# Patient Record
Sex: MALE | Race: BLACK OR AFRICAN AMERICAN | Employment: FULL TIME | URBAN - METROPOLITAN AREA
[De-identification: names, ages, dates, MRNs, and addresses within clinical notes are randomized per-mention and may not be internally consistent; named-entity substitution may affect disease eponyms.]

---

## 2019-05-27 ENCOUNTER — HOSPITAL ENCOUNTER (EMERGENCY)
Age: 44
Discharge: HOME | End: 2019-05-27
Payer: COMMERCIAL

## 2019-05-27 ENCOUNTER — APPOINTMENT (OUTPATIENT)
Dept: GENERAL RADIOLOGY | Age: 44
End: 2019-05-27
Payer: COMMERCIAL

## 2019-05-27 VITALS
SYSTOLIC BLOOD PRESSURE: 144 MMHG | RESPIRATION RATE: 16 BRPM | HEIGHT: 71 IN | BODY MASS INDEX: 27.72 KG/M2 | TEMPERATURE: 98.3 F | HEART RATE: 76 BPM | WEIGHT: 198 LBS | DIASTOLIC BLOOD PRESSURE: 82 MMHG | OXYGEN SATURATION: 98 %

## 2019-05-27 DIAGNOSIS — M25.561 ACUTE PAIN OF RIGHT KNEE: Primary | ICD-10-CM

## 2019-05-27 DIAGNOSIS — R03.0 ELEVATED BLOOD PRESSURE READING: ICD-10-CM

## 2019-05-27 PROCEDURE — 99283 EMERGENCY DEPT VISIT LOW MDM: CPT

## 2019-05-27 PROCEDURE — 73564 X-RAY EXAM KNEE 4 OR MORE: CPT

## 2019-05-27 PROCEDURE — 6370000000 HC RX 637 (ALT 250 FOR IP): Performed by: NURSE PRACTITIONER

## 2019-05-27 RX ORDER — NAPROXEN 500 MG/1
500 TABLET ORAL 2 TIMES DAILY WITH MEALS
Qty: 20 TABLET | Refills: 0 | Status: SHIPPED | OUTPATIENT
Start: 2019-05-27 | End: 2019-06-06

## 2019-05-27 RX ORDER — IBUPROFEN 400 MG/1
400 TABLET ORAL ONCE
Status: COMPLETED | OUTPATIENT
Start: 2019-05-27 | End: 2019-05-27

## 2019-05-27 RX ORDER — OXYCODONE HYDROCHLORIDE AND ACETAMINOPHEN 5; 325 MG/1; MG/1
1 TABLET ORAL ONCE
Status: COMPLETED | OUTPATIENT
Start: 2019-05-27 | End: 2019-05-27

## 2019-05-27 RX ADMIN — OXYCODONE HYDROCHLORIDE AND ACETAMINOPHEN 1 TABLET: 5; 325 TABLET ORAL at 19:22

## 2019-05-27 RX ADMIN — IBUPROFEN 400 MG: 400 TABLET, FILM COATED ORAL at 19:23

## 2019-05-27 ASSESSMENT — PAIN SCALES - GENERAL
PAINLEVEL_OUTOF10: 3

## 2019-05-27 ASSESSMENT — PAIN DESCRIPTION - ORIENTATION: ORIENTATION: LEFT

## 2019-05-27 ASSESSMENT — PAIN DESCRIPTION - LOCATION: LOCATION: KNEE

## 2019-05-27 ASSESSMENT — PAIN DESCRIPTION - DESCRIPTORS: DESCRIPTORS: TIGHTNESS

## 2019-05-27 NOTE — ED NOTES
Knee immobilizer applied to left knee, CMP's intact before and after application      Waleska Parks, MATTHEW  05/27/19 9029

## 2019-05-27 NOTE — ED PROVIDER NOTES
[facility]    HPI:  5/27/19,   Time: 7:16 PM         Chuy Maravilla is a 38 y.o. male presenting to the ED from home with family and complains of acute onset of left knee pain and swelling that began pta when he felt a pop when jumping up during a kickball game. The complaint has been persistent, mild in severity, and worsened by walking. ROS:   Pertinent positives and negatives are stated within HPI, all other systems reviewed and are negative.  --------------------------------------------- PAST HISTORY ---------------------------------------------  Past Medical History:  has no past medical history on file. Past Surgical History:  has a past surgical history that includes Foot surgery. Social History:  reports that he has never smoked. He has never used smokeless tobacco. He reports that he drinks alcohol. He reports that he has current or past drug history. Drug: Marijuana. Family History: family history is not on file. The patients home medications have been reviewed. Allergies: Patient has no known allergies. -------------------------------------------------- RESULTS -------------------------------------------------  All laboratory and radiology results have been personally reviewed by myself   LABS:  No results found for this visit on 05/27/19. RADIOLOGY:  Interpreted by Radiologist.  XR KNEE LEFT (MIN 4 VIEWS)   Final Result   Unremarkable x-ray series of the left kidney. No acute fractures or dislocations identified. There is no left knee joint effusion.          ------------------------- NURSING NOTES AND VITALS REVIEWED ---------------------------   The nursing notes within the ED encounter and vital signs as below have been reviewed.    BP (!) 149/84   Pulse 80   Temp 98.3 °F (36.8 °C) (Oral)   Resp 16   Ht 5' 11\" (1.803 m)   Wt 198 lb (89.8 kg)   SpO2 98%   BMI 27.62 kg/m²   Oxygen Saturation Interpretation: Normal      ---------------------------------------------------PHYSICAL EXAM--------------------------------------      Constitutional/General: Alert and oriented x3, well appearing, non toxic in NAD  Head: NC/AT  Eyes: PERRL, EOMI  Mouth: Oropharynx clear, handling secretions, no trismus  Neck: Supple, full ROM, no meningeal signs  Pulmonary: Lungs clear to auscultation bilaterally, no wheezes, rales, or rhonchi. Not in respiratory distress  Cardiovascular:  Regular rate and rhythm, no murmurs, gallops, or rubs. 2+ distal pulses  Abdomen: Soft, non tender, non distended,   Extremities: Moves all extremities x 4. Warm and well perfused, decreased rom left knee due to pain swelling mostly with flexion, sensation intact, distal pulses intact,   Skin: warm and dry without rash  Neurologic: GCS 15,  Psych: Normal Affect      ------------------------------ ED COURSE/MEDICAL DECISION MAKING----------------------  Medications   ibuprofen (ADVIL;MOTRIN) tablet 400 mg (400 mg Oral Given 5/27/19 1923)   oxyCODONE-acetaminophen (PERCOCET) 5-325 MG per tablet 1 tablet (1 tablet Oral Given 5/27/19 1922)         Medical Decision Making:    Patient in no acute distress, rule out fx, crutches and knee immobilizer    Counseling: The emergency provider has spoken with the patient and discussed todays results, in addition to providing specific details for the plan of care and counseling regarding the diagnosis and prognosis. Questions are answered at this time and they are agreeable with the plan.      --------------------------------- IMPRESSION AND DISPOSITION ---------------------------------    IMPRESSION  1. Acute pain of right knee    2.  Elevated blood pressure reading        DISPOSITION  Disposition: Discharge to home  Patient condition is good               ANNABEL Baugh CNP  05/27/19 1950

## 2019-05-31 NOTE — PROGRESS NOTES
[facility] PRE-ADMISSION TESTING INSTRUCTIONS    The Preadmission Testing patient is instructed accordingly using the following criteria (check applicable):    ARRIVAL INSTRUCTIONS:  [x] Parking the day of Surgery is located in the Main Entrance lot. Upon entering the door, make an immediate right to the surgery reception desk    [x] Complimentary [address] is available Monday through Friday 6 am to 6 pm    [x] Bring photo ID and insurance card    [] Bring in a copy of Living will or Durable Power of  papers. [x] Please be sure to arrange for responsible adult to provide transportation to and from the hospital    [x] Please arrange for responsible adult to be with you for the 24 hour period post procedure due to having anesthesia      GENERAL INSTRUCTIONS:    [x] Nothing by mouth after midnight, including gum, candy, mints or water    [x] You may brush your teeth, but do not swallow any water    [] Take medications as instructed with 1-2 oz of water    [x] Stop herbal supplements and vitamins 5 days prior to procedure    [x] Follow preop dosing of blood thinners per physician instructions    [] Take 1/2 dose of evening insulin, but no insulin after midnight    [] No oral diabetic medications after midnight    [] If diabetic and have low blood sugar or feel symptomatic, take 1-2oz apple juice only    [] Bring inhalers day of surgery    [] Bring C-PAP/ Bi-Pap day of surgery    [] Bring urine specimen day of surgery    [x] Shower or bath with soap, lather and rinse well, AM of Surgery, no lotion, powders or creams to surgical site    [] Follow bowel prep as instructed per surgeon    [x] No tobacco products within 24 hours of surgery     [x] No alcohol or illegal drug use within 24 hours of surgery.     [x] Jewelry, body piercing's, eyeglasses, contact lenses and dentures are not permitted into surgery (bring cases)      [] Please do not wear any nail polish, make up or hair products on the day of surgery    [x] If not already done, you can expect a call from registration    [x] You can expect a call the business day prior to procedure to notify you if your arrival time changes    [] If you receive a survey after surgery we would greatly appreciate your comments    [] Parent/guardian of a minor must accompany their child and remain on the premises  the entire time they are under our care     [] Pediatric patients may bring favorite toy, blanket or comfort item with them    [] A caregiver or family member must remain with the patient during their stay if they are mentally handicapped, have dementia, disoriented or unable to use a call light or would be a safety concern if left unattended    [x] Please notify surgeon if you develop any illness between now and time of surgery (cold, cough, sore throat, fever, nausea, vomiting) or any signs of infections  including skin, wounds, and dental.    [x]  The Outpatient Pharmacy is available to fill your prescription here on your day of surgery, ask your preop nurse for details    [] Other instructions  EDUCATIONAL MATERIALS PROVIDED:    [] PAT Preoperative Education Packet/Booklet     [] Medication List    [] Fluoroscopy Information Pamphlet    [] Transfusion bracelet applied with instructions    [] Joint replacement video reviewed    [] Shower with soap, lather and rinse well, and use CHG wipes provided the evening before surgery as instructed

## 2019-06-03 ENCOUNTER — ANESTHESIA EVENT (OUTPATIENT)
Dept: OPERATING ROOM | Age: 44
End: 2019-06-03
Payer: COMMERCIAL

## 2019-06-03 ENCOUNTER — HOSPITAL ENCOUNTER (OUTPATIENT)
Age: 44
Setting detail: OUTPATIENT SURGERY
Discharge: HOME | End: 2019-06-03
Attending: ORTHOPAEDIC SURGERY | Admitting: ORTHOPAEDIC SURGERY
Payer: COMMERCIAL

## 2019-06-03 ENCOUNTER — ANESTHESIA (OUTPATIENT)
Dept: OPERATING ROOM | Age: 44
End: 2019-06-03
Payer: COMMERCIAL

## 2019-06-03 VITALS
HEIGHT: 72 IN | TEMPERATURE: 97.5 F | RESPIRATION RATE: 18 BRPM | BODY MASS INDEX: 26.41 KG/M2 | WEIGHT: 195 LBS | OXYGEN SATURATION: 97 % | HEART RATE: 85 BPM | DIASTOLIC BLOOD PRESSURE: 94 MMHG | SYSTOLIC BLOOD PRESSURE: 148 MMHG

## 2019-06-03 VITALS
SYSTOLIC BLOOD PRESSURE: 99 MMHG | DIASTOLIC BLOOD PRESSURE: 55 MMHG | RESPIRATION RATE: 1 BRPM | TEMPERATURE: 96.4 F | OXYGEN SATURATION: 100 %

## 2019-06-03 DIAGNOSIS — S86.892A: Primary | ICD-10-CM

## 2019-06-03 PROCEDURE — 2500000003 HC RX 250 WO HCPCS: Performed by: NURSE ANESTHETIST, CERTIFIED REGISTERED

## 2019-06-03 PROCEDURE — 7100000011 HC PHASE II RECOVERY - ADDTL 15 MIN: Performed by: ORTHOPAEDIC SURGERY

## 2019-06-03 PROCEDURE — 2709999900 HC NON-CHARGEABLE SUPPLY: Performed by: ORTHOPAEDIC SURGERY

## 2019-06-03 PROCEDURE — 3700000001 HC ADD 15 MINUTES (ANESTHESIA): Performed by: ORTHOPAEDIC SURGERY

## 2019-06-03 PROCEDURE — 6370000000 HC RX 637 (ALT 250 FOR IP): Performed by: ANESTHESIOLOGY

## 2019-06-03 PROCEDURE — 6360000002 HC RX W HCPCS: Performed by: NURSE ANESTHETIST, CERTIFIED REGISTERED

## 2019-06-03 PROCEDURE — 7100000010 HC PHASE II RECOVERY - FIRST 15 MIN: Performed by: ORTHOPAEDIC SURGERY

## 2019-06-03 PROCEDURE — 7100000001 HC PACU RECOVERY - ADDTL 15 MIN: Performed by: ORTHOPAEDIC SURGERY

## 2019-06-03 PROCEDURE — 3600000012 HC SURGERY LEVEL 2 ADDTL 15MIN: Performed by: ORTHOPAEDIC SURGERY

## 2019-06-03 PROCEDURE — 3700000000 HC ANESTHESIA ATTENDED CARE: Performed by: ORTHOPAEDIC SURGERY

## 2019-06-03 PROCEDURE — 3600000002 HC SURGERY LEVEL 2 BASE: Performed by: ORTHOPAEDIC SURGERY

## 2019-06-03 PROCEDURE — 7100000000 HC PACU RECOVERY - FIRST 15 MIN: Performed by: ORTHOPAEDIC SURGERY

## 2019-06-03 PROCEDURE — 2580000003 HC RX 258: Performed by: NURSE ANESTHETIST, CERTIFIED REGISTERED

## 2019-06-03 PROCEDURE — 2500000003 HC RX 250 WO HCPCS: Performed by: ORTHOPAEDIC SURGERY

## 2019-06-03 RX ORDER — PROMETHAZINE HYDROCHLORIDE 25 MG/ML
6.25 INJECTION, SOLUTION INTRAMUSCULAR; INTRAVENOUS
Status: DISCONTINUED | OUTPATIENT
Start: 2019-06-03 | End: 2019-06-03 | Stop reason: HOSPADM

## 2019-06-03 RX ORDER — BUPIVACAINE HYDROCHLORIDE 5 MG/ML
INJECTION, SOLUTION EPIDURAL; INTRACAUDAL PRN
Status: DISCONTINUED | OUTPATIENT
Start: 2019-06-03 | End: 2019-06-03 | Stop reason: ALTCHOICE

## 2019-06-03 RX ORDER — FENTANYL CITRATE 50 UG/ML
25 INJECTION, SOLUTION INTRAMUSCULAR; INTRAVENOUS EVERY 5 MIN PRN
Status: DISCONTINUED | OUTPATIENT
Start: 2019-06-03 | End: 2019-06-03 | Stop reason: HOSPADM

## 2019-06-03 RX ORDER — LIDOCAINE HYDROCHLORIDE 20 MG/ML
INJECTION, SOLUTION EPIDURAL; INFILTRATION; INTRACAUDAL; PERINEURAL PRN
Status: DISCONTINUED | OUTPATIENT
Start: 2019-06-03 | End: 2019-06-03 | Stop reason: SDUPTHER

## 2019-06-03 RX ORDER — KETOROLAC TROMETHAMINE 30 MG/ML
INJECTION, SOLUTION INTRAMUSCULAR; INTRAVENOUS PRN
Status: DISCONTINUED | OUTPATIENT
Start: 2019-06-03 | End: 2019-06-03 | Stop reason: SDUPTHER

## 2019-06-03 RX ORDER — ONDANSETRON 2 MG/ML
INJECTION INTRAMUSCULAR; INTRAVENOUS PRN
Status: DISCONTINUED | OUTPATIENT
Start: 2019-06-03 | End: 2019-06-03 | Stop reason: SDUPTHER

## 2019-06-03 RX ORDER — FENTANYL CITRATE 50 UG/ML
INJECTION, SOLUTION INTRAMUSCULAR; INTRAVENOUS PRN
Status: DISCONTINUED | OUTPATIENT
Start: 2019-06-03 | End: 2019-06-03 | Stop reason: SDUPTHER

## 2019-06-03 RX ORDER — MEPERIDINE HYDROCHLORIDE 25 MG/ML
12.5 INJECTION INTRAMUSCULAR; INTRAVENOUS; SUBCUTANEOUS EVERY 5 MIN PRN
Status: DISCONTINUED | OUTPATIENT
Start: 2019-06-03 | End: 2019-06-03 | Stop reason: HOSPADM

## 2019-06-03 RX ORDER — SODIUM CHLORIDE 0.9 % (FLUSH) 0.9 %
10 SYRINGE (ML) INJECTION EVERY 12 HOURS SCHEDULED
Status: DISCONTINUED | OUTPATIENT
Start: 2019-06-03 | End: 2019-06-03 | Stop reason: HOSPADM

## 2019-06-03 RX ORDER — FENTANYL CITRATE 50 UG/ML
50 INJECTION, SOLUTION INTRAMUSCULAR; INTRAVENOUS EVERY 5 MIN PRN
Status: DISCONTINUED | OUTPATIENT
Start: 2019-06-03 | End: 2019-06-03 | Stop reason: HOSPADM

## 2019-06-03 RX ORDER — DEXAMETHASONE SODIUM PHOSPHATE 4 MG/ML
INJECTION, SOLUTION INTRA-ARTICULAR; INTRALESIONAL; INTRAMUSCULAR; INTRAVENOUS; SOFT TISSUE PRN
Status: DISCONTINUED | OUTPATIENT
Start: 2019-06-03 | End: 2019-06-03 | Stop reason: SDUPTHER

## 2019-06-03 RX ORDER — SODIUM CHLORIDE 9 MG/ML
INJECTION, SOLUTION INTRAVENOUS CONTINUOUS PRN
Status: DISCONTINUED | OUTPATIENT
Start: 2019-06-03 | End: 2019-06-03 | Stop reason: SDUPTHER

## 2019-06-03 RX ORDER — PROPOFOL 10 MG/ML
INJECTION, EMULSION INTRAVENOUS PRN
Status: DISCONTINUED | OUTPATIENT
Start: 2019-06-03 | End: 2019-06-03 | Stop reason: SDUPTHER

## 2019-06-03 RX ORDER — ROCURONIUM BROMIDE 10 MG/ML
INJECTION, SOLUTION INTRAVENOUS PRN
Status: DISCONTINUED | OUTPATIENT
Start: 2019-06-03 | End: 2019-06-03 | Stop reason: SDUPTHER

## 2019-06-03 RX ORDER — NEOSTIGMINE METHYLSULFATE 1 MG/ML
INJECTION, SOLUTION INTRAVENOUS PRN
Status: DISCONTINUED | OUTPATIENT
Start: 2019-06-03 | End: 2019-06-03 | Stop reason: SDUPTHER

## 2019-06-03 RX ORDER — ASPIRIN 325 MG
325 TABLET, DELAYED RELEASE (ENTERIC COATED) ORAL DAILY
Qty: 42 TABLET | Refills: 0 | Status: SHIPPED | OUTPATIENT
Start: 2019-06-03 | End: 2019-09-13

## 2019-06-03 RX ORDER — DIPHENHYDRAMINE HYDROCHLORIDE 50 MG/ML
12.5 INJECTION INTRAMUSCULAR; INTRAVENOUS
Status: DISCONTINUED | OUTPATIENT
Start: 2019-06-03 | End: 2019-06-03 | Stop reason: HOSPADM

## 2019-06-03 RX ORDER — HYDROCODONE BITARTRATE AND ACETAMINOPHEN 5; 325 MG/1; MG/1
1 TABLET ORAL EVERY 6 HOURS PRN
Qty: 28 TABLET | Refills: 0 | Status: SHIPPED | OUTPATIENT
Start: 2019-06-03 | End: 2019-06-10

## 2019-06-03 RX ORDER — CEFAZOLIN SODIUM 1 G/3ML
INJECTION, POWDER, FOR SOLUTION INTRAMUSCULAR; INTRAVENOUS PRN
Status: DISCONTINUED | OUTPATIENT
Start: 2019-06-03 | End: 2019-06-03 | Stop reason: SDUPTHER

## 2019-06-03 RX ORDER — MIDAZOLAM HYDROCHLORIDE 1 MG/ML
INJECTION INTRAMUSCULAR; INTRAVENOUS PRN
Status: DISCONTINUED | OUTPATIENT
Start: 2019-06-03 | End: 2019-06-03 | Stop reason: SDUPTHER

## 2019-06-03 RX ORDER — OXYCODONE HYDROCHLORIDE AND ACETAMINOPHEN 5; 325 MG/1; MG/1
1 TABLET ORAL
Status: COMPLETED | OUTPATIENT
Start: 2019-06-03 | End: 2019-06-03

## 2019-06-03 RX ORDER — GLYCOPYRROLATE 1 MG/5 ML
SYRINGE (ML) INTRAVENOUS PRN
Status: DISCONTINUED | OUTPATIENT
Start: 2019-06-03 | End: 2019-06-03 | Stop reason: SDUPTHER

## 2019-06-03 RX ORDER — SODIUM CHLORIDE 0.9 % (FLUSH) 0.9 %
10 SYRINGE (ML) INJECTION PRN
Status: DISCONTINUED | OUTPATIENT
Start: 2019-06-03 | End: 2019-06-03 | Stop reason: HOSPADM

## 2019-06-03 RX ADMIN — PROPOFOL 250 MG: 10 INJECTION, EMULSION INTRAVENOUS at 15:00

## 2019-06-03 RX ADMIN — Medication 0.6 MG: at 16:47

## 2019-06-03 RX ADMIN — LIDOCAINE HYDROCHLORIDE 100 MG: 20 INJECTION, SOLUTION EPIDURAL; INFILTRATION; INTRACAUDAL; PERINEURAL at 15:00

## 2019-06-03 RX ADMIN — ONDANSETRON HYDROCHLORIDE 4 MG: 2 INJECTION, SOLUTION INTRAMUSCULAR; INTRAVENOUS at 16:15

## 2019-06-03 RX ADMIN — SODIUM CHLORIDE: 9 INJECTION, SOLUTION INTRAVENOUS at 15:33

## 2019-06-03 RX ADMIN — KETOROLAC TROMETHAMINE 30 MG: 30 INJECTION, SOLUTION INTRAMUSCULAR; INTRAVENOUS at 16:47

## 2019-06-03 RX ADMIN — DEXAMETHASONE SODIUM PHOSPHATE 10 MG: 4 INJECTION, SOLUTION INTRAMUSCULAR; INTRAVENOUS at 15:10

## 2019-06-03 RX ADMIN — SODIUM CHLORIDE: 9 INJECTION, SOLUTION INTRAVENOUS at 16:44

## 2019-06-03 RX ADMIN — SODIUM CHLORIDE: 9 INJECTION, SOLUTION INTRAVENOUS at 14:55

## 2019-06-03 RX ADMIN — MIDAZOLAM HYDROCHLORIDE 2 MG: 1 INJECTION, SOLUTION INTRAMUSCULAR; INTRAVENOUS at 14:55

## 2019-06-03 RX ADMIN — FENTANYL CITRATE 50 MCG: 50 INJECTION, SOLUTION INTRAMUSCULAR; INTRAVENOUS at 16:00

## 2019-06-03 RX ADMIN — FENTANYL CITRATE 50 MCG: 50 INJECTION, SOLUTION INTRAMUSCULAR; INTRAVENOUS at 15:00

## 2019-06-03 RX ADMIN — FENTANYL CITRATE 50 MCG: 50 INJECTION, SOLUTION INTRAMUSCULAR; INTRAVENOUS at 16:45

## 2019-06-03 RX ADMIN — FENTANYL CITRATE 100 MCG: 50 INJECTION, SOLUTION INTRAMUSCULAR; INTRAVENOUS at 15:20

## 2019-06-03 RX ADMIN — ROCURONIUM BROMIDE 40 MG: 10 SOLUTION INTRAVENOUS at 15:00

## 2019-06-03 RX ADMIN — OXYCODONE HYDROCHLORIDE AND ACETAMINOPHEN 1 TABLET: 5; 325 TABLET ORAL at 17:35

## 2019-06-03 RX ADMIN — CEFAZOLIN 2000 MG: 1 INJECTION, POWDER, FOR SOLUTION INTRAMUSCULAR; INTRAVENOUS at 15:06

## 2019-06-03 RX ADMIN — Medication 3 MG: at 16:47

## 2019-06-03 ASSESSMENT — PULMONARY FUNCTION TESTS
PIF_VALUE: 21
PIF_VALUE: 21
PIF_VALUE: 20
PIF_VALUE: 21
PIF_VALUE: 20
PIF_VALUE: 21
PIF_VALUE: 20
PIF_VALUE: 21
PIF_VALUE: 20
PIF_VALUE: 21
PIF_VALUE: 3
PIF_VALUE: 23
PIF_VALUE: 21
PIF_VALUE: 20
PIF_VALUE: 21
PIF_VALUE: 20
PIF_VALUE: 21
PIF_VALUE: 0
PIF_VALUE: 21
PIF_VALUE: 0
PIF_VALUE: 21
PIF_VALUE: 0
PIF_VALUE: 21
PIF_VALUE: 1
PIF_VALUE: 21
PIF_VALUE: 0
PIF_VALUE: 20
PIF_VALUE: 21
PIF_VALUE: 0
PIF_VALUE: 21
PIF_VALUE: 20
PIF_VALUE: 22
PIF_VALUE: 21
PIF_VALUE: 0
PIF_VALUE: 21
PIF_VALUE: 0
PIF_VALUE: 20
PIF_VALUE: 21
PIF_VALUE: 20
PIF_VALUE: 21
PIF_VALUE: 20
PIF_VALUE: 34
PIF_VALUE: 1
PIF_VALUE: 21
PIF_VALUE: 0
PIF_VALUE: 21
PIF_VALUE: 0
PIF_VALUE: 20
PIF_VALUE: 21
PIF_VALUE: 3
PIF_VALUE: 20
PIF_VALUE: 0
PIF_VALUE: 20
PIF_VALUE: 21
PIF_VALUE: 21
PIF_VALUE: 20
PIF_VALUE: 21
PIF_VALUE: 18
PIF_VALUE: 21
PIF_VALUE: 20
PIF_VALUE: 21
PIF_VALUE: 21
PIF_VALUE: 1
PIF_VALUE: 21
PIF_VALUE: 20
PIF_VALUE: 20
PIF_VALUE: 21
PIF_VALUE: 20
PIF_VALUE: 20
PIF_VALUE: 21
PIF_VALUE: 21
PIF_VALUE: 1
PIF_VALUE: 21
PIF_VALUE: 1
PIF_VALUE: 21
PIF_VALUE: 25
PIF_VALUE: 21
PIF_VALUE: 3

## 2019-06-03 ASSESSMENT — PAIN - FUNCTIONAL ASSESSMENT: PAIN_FUNCTIONAL_ASSESSMENT: 0-10

## 2019-06-03 ASSESSMENT — PAIN DESCRIPTION - ORIENTATION
ORIENTATION: LEFT

## 2019-06-03 ASSESSMENT — PAIN DESCRIPTION - LOCATION
LOCATION: KNEE

## 2019-06-03 ASSESSMENT — PAIN SCALES - GENERAL
PAINLEVEL_OUTOF10: 5
PAINLEVEL_OUTOF10: 6
PAINLEVEL_OUTOF10: 7

## 2019-06-03 ASSESSMENT — PAIN DESCRIPTION - PAIN TYPE
TYPE: SURGICAL PAIN

## 2019-06-03 NOTE — OP NOTE
Operative Report  Coco Graff  [id number]  6/3/2019    Pre-operative diagnosis: Left patella tendon rupture    Post-operative diagnosis: Left patella tendon rupture    Procedure: Left patella tendon repair    Surgeon: Elena Castillo MD    Assistant:  Yovani Hernandez RN    Anesthesia:  General    Operative Indication:  38 y.o. male sustained a left patella tendon rupture while playing kick ball. Patient was seen in the office and surgical treatment was discussed. The rationale behind surgery as a means treatment and early mobilization / rehabilitation was explained and informed consent was obtained following a thorough review of risks, benefits, and alternative treatment options. The risks for surgery that were discussed include bleeding, infection, damage to blood vessels, damage to nerves, risk of further surgery, restricted range of motion, risk of continued discomfort, risk of malunion, risk of nonunion, risk of need for further reconstructive procedures, risk of need for altered activities and altered gait, risk of blood clots, pulmonary embolism, myocardial infarction, and risk of death were discussed. The patient understood these risks and consented for surgery. The typical post-operative recovery process was also discussed. EBL: <65 ml    Complications: None    Operative Procedure: The patient was positioned supine on the regular table and general anaesthesia was achieved. The patient was then positioned on a regular OR table and tourniquet was applied. At this point, the leg was then prepped and draped in the usual sterile manner. A timeout was performed identifying the patient, operative site and procedure being performed. A midline incision was then made over the patella. Full thickness flaps were then raised. Tears in the medial and lateral retinaculum were identified. The paratenon was incised exposing the patella tendon.   He was noted to have a complete patella tendon rupture off the inferior pole of the patella. The edges of the patella tendon were then sharply debrided. The distal pole of the patella was then debrided with a sharp knife and a small ronguer. The wound was then irrigated with normal saline. 2 allis clamps were placed on the patellar tendon and 2 #5 fiber wires were then sutured into the patellar tendon using a [place] stitch.  4 limbs exited the proximal limb of the patellar tendon. A beef pin was then used to drill 3 holes from the inferior pole to the superior pole of the patella and pass the suture. The leg was placed in full extension and sutures were tied. It was noted the proximal end of the patella tendon was up against the inferior pole of the patella with no gapping. The wound and joint was thoroughly irrigated with normal saline. The peritenon was then closed with #0 vicryl. The medial and lateral retinaculum was repaired with #2 fiberwire. The fascial layers were then reapproximated using #0 Vicryl in a figure-of-eight manner, the subcutaneous tissues were reapproximated in layers using  2-0 stratofix sutures, and the skin was reapproximated with 3-0 stratofix, dermabond and steri strips. The inscision was then infiltrated with 20 ml of a 0.5% Marcaine. A sterile dressing was then applied. The patient was then place in a knee immobilizer. There were no complications and the patient tolerated the procedure well. The patient was taken to the recovery room in stable condition.     Flor Chan MD
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
## 2019-06-03 NOTE — ANESTHESIA PRE PROCEDURE
Department of Anesthesiology  Preprocedure Note       Name:  Paulie Brandon   Age:  38 y.o.  :  1975                                          MRN:  [id number]         Date:  6/3/2019      Surgeon: Dillon Meneses):  Dalton Saenz MD    Procedure: LEFT LEG PATELLA TENDON REPAIR (Left )    Medications prior to admission:   Prior to Admission medications    Medication Sig Start Date End Date Taking? Authorizing Provider   naproxen (NAPROSYN) 500 MG tablet Take 1 tablet by mouth 2 times daily (with meals) for 10 days 19 Yes Frankie Tucker, APRN - CNP   Multiple Vitamins-Minerals (THERAPEUTIC MULTIVITAMIN-MINERALS) tablet Take 1 tablet by mouth daily Ld 2019   Yes Historical Provider, MD       Current medications:    Current Facility-Administered Medications   Medication Dose Route Frequency Provider Last Rate Last Dose    sodium chloride flush 0.9 % injection 10 mL  10 mL Intravenous 2 times per day Dalton Saenz MD        sodium chloride flush 0.9 % injection 10 mL  10 mL Intravenous PRN Dalton Saenz MD           Allergies:  No Known Allergies    Problem List:  There is no problem list on file for this patient.       Past Medical History:        Diagnosis Date    Pain in left knee 2019       Past Surgical History:        Procedure Laterality Date    FOOT SURGERY Left as youth       Social History:    Social History     Tobacco Use    Smoking status: Never Smoker    Smokeless tobacco: Never Used   Substance Use Topics    Alcohol use: Yes     Comment: occasional                                Counseling given: Not Answered      Vital Signs (Current):   Vitals:    19 1054   Weight: 195 lb (88.5 kg)   Height: 6' (1.829 m)                                              BP Readings from Last 3 Encounters:   19 (!) 144/82   19 120/78   18 118/82       NPO Status:  greater than 8 hours                                                                               BMI:   Wt Readings from Last 3 Encounters:   05/31/19 195 lb (88.5 kg)   05/27/19 198 lb (89.8 kg)   03/01/19 203 lb (92.1 kg)     Body mass index is 26.45 kg/m². CBC: No results found for: WBC, RBC, HGB, HCT, MCV, RDW, PLT    CMP: No results found for: NA, K, CL, CO2, BUN, CREATININE, GFRAA, AGRATIO, LABGLOM, GLUCOSE, PROT, CALCIUM, BILITOT, ALKPHOS, AST, ALT    POC Tests: No results for input(s): POCGLU, POCNA, POCK, POCCL, POCBUN, POCHEMO, POCHCT in the last 72 hours. Coags: No results found for: PROTIME, INR, APTT    HCG (If Applicable): No results found for: PREGTESTUR, PREGSERUM, HCG, HCGQUANT     ABGs: No results found for: PHART, PO2ART, WHM2NQH, INS2NAC, BEART, H4KPBDII     Type & Screen (If Applicable):  No results found for: [facility]    Anesthesia Evaluation  Patient summary reviewed no history of anesthetic complications:   Airway: Mallampati: II  TM distance: >3 FB   Neck ROM: full  Mouth opening: > = 3 FB Dental:          Pulmonary:Negative Pulmonary ROS breath sounds clear to auscultation                             Cardiovascular:  Exercise tolerance: good (>4 METS),   (+) valvular problems/murmurs:,         Rhythm: regular  Rate: normal                 ROS comment: ECHO (2/2018): Normal left ventricle size and systolic function.   Ejection fraction is visually estimated at 55%.   No regional wall motion abnormalities seen.   Right ventricle global systolic function is normal .   Mild mitral regurgitation is present.   No hemodynamically significant aortic stenosis is present.   Mild tricuspid regurgitation.   Normal PA systolic pressure.   Mild pulmonic regurgitation present.   No evidence for hemodynamically significant pericardial effusion.   No previous echo for comparison.      Neuro/Psych:   Negative Neuro/Psych ROS              GI/Hepatic/Renal: Neg GI/Hepatic/Renal ROS            Endo/Other:                      ROS comment: S/p injury about a week ago while playing kickball Abdominal: Vascular: negative vascular ROS. Anesthesia Plan      general     ASA 2       Induction: intravenous. Anesthetic plan and risks discussed with patient. Plan discussed with CRNA.                   Holli Hamilton MD   6/3/2019

## 2022-01-26 ENCOUNTER — APPOINTMENT (OUTPATIENT)
Dept: GENERAL RADIOLOGY | Age: 47
DRG: 639 | End: 2022-01-26

## 2022-01-26 ENCOUNTER — HOSPITAL ENCOUNTER (INPATIENT)
Age: 47
LOS: 2 days | Discharge: HOME | DRG: 639 | End: 2022-01-28
Attending: STUDENT IN AN ORGANIZED HEALTH CARE EDUCATION/TRAINING PROGRAM | Admitting: STUDENT IN AN ORGANIZED HEALTH CARE EDUCATION/TRAINING PROGRAM
Payer: COMMERCIAL

## 2022-01-26 DIAGNOSIS — R42 DIZZINESS: ICD-10-CM

## 2022-01-26 DIAGNOSIS — E11.9 DIABETES MELLITUS, NEW ONSET (HCC): Primary | ICD-10-CM

## 2022-01-26 DIAGNOSIS — E10.10 DIABETIC KETOACIDOSIS WITHOUT COMA ASSOCIATED WITH TYPE 1 DIABETES MELLITUS (HCC): ICD-10-CM

## 2022-01-26 PROBLEM — E13.10 SECONDARY DM WITH DKA, UNCONTROLLED (HCC): Status: ACTIVE | Noted: 2022-01-26

## 2022-01-26 LAB
ALBUMIN SERPL-MCNC: 4.9 G/DL (ref 3.5–5.2)
ALP BLD-CCNC: 108 U/L (ref 40–129)
ALT SERPL-CCNC: 13 U/L (ref 0–40)
AMPHETAMINE SCREEN, URINE: NOT DETECTED
ANION GAP SERPL CALCULATED.3IONS-SCNC: 12 MMOL/L (ref 7–16)
ANION GAP SERPL CALCULATED.3IONS-SCNC: 25 MMOL/L (ref 7–16)
ANION GAP SERPL CALCULATED.3IONS-SCNC: 29 MMOL/L (ref 7–16)
AST SERPL-CCNC: 14 U/L (ref 0–39)
B.E.: -19.1 MMOL/L (ref -3–3)
BACTERIA: ABNORMAL /HPF
BARBITURATE SCREEN URINE: NOT DETECTED
BASOPHILS ABSOLUTE: 0.04 E9/L (ref 0–0.2)
BASOPHILS RELATIVE PERCENT: 0.5 % (ref 0–2)
BENZODIAZEPINE SCREEN, URINE: NOT DETECTED
BETA-HYDROXYBUTYRATE: >4.5 MMOL/L (ref 0.02–0.27)
BILIRUB SERPL-MCNC: 0.3 MG/DL (ref 0–1.2)
BILIRUBIN URINE: NEGATIVE
BLOOD, URINE: ABNORMAL
BUN BLDV-MCNC: 13 MG/DL (ref 6–20)
BUN BLDV-MCNC: 15 MG/DL (ref 6–20)
BUN BLDV-MCNC: 9 MG/DL (ref 6–20)
CALCIUM SERPL-MCNC: 7.5 MG/DL (ref 8.6–10.2)
CALCIUM SERPL-MCNC: 8.1 MG/DL (ref 8.6–10.2)
CALCIUM SERPL-MCNC: 9.2 MG/DL (ref 8.6–10.2)
CANNABINOID SCREEN URINE: NOT DETECTED
CHLORIDE BLD-SCNC: 103 MMOL/L (ref 98–107)
CHLORIDE BLD-SCNC: 104 MMOL/L (ref 98–107)
CHLORIDE BLD-SCNC: 96 MMOL/L (ref 98–107)
CLARITY: CLEAR
CO2: 10 MMOL/L (ref 22–29)
CO2: 11 MMOL/L (ref 22–29)
CO2: 16 MMOL/L (ref 22–29)
COCAINE METABOLITE SCREEN URINE: NOT DETECTED
COHB: 0.3 % (ref 0–1.5)
COLOR: YELLOW
COMMENT: ABNORMAL
CREAT SERPL-MCNC: 0.9 MG/DL (ref 0.7–1.2)
CREAT SERPL-MCNC: 1.1 MG/DL (ref 0.7–1.2)
CREAT SERPL-MCNC: 1.1 MG/DL (ref 0.7–1.2)
CRITICAL: ABNORMAL
D DIMER: <200 NG/ML DDU
DATE ANALYZED: ABNORMAL
DATE OF COLLECTION: ABNORMAL
EKG ATRIAL RATE: 111 BPM
EKG P AXIS: 75 DEGREES
EKG P-R INTERVAL: 144 MS
EKG Q-T INTERVAL: 332 MS
EKG QRS DURATION: 90 MS
EKG QTC CALCULATION (BAZETT): 451 MS
EKG R AXIS: 68 DEGREES
EKG T AXIS: 65 DEGREES
EKG VENTRICULAR RATE: 111 BPM
EOSINOPHILS ABSOLUTE: 0.13 E9/L (ref 0.05–0.5)
EOSINOPHILS RELATIVE PERCENT: 1.5 % (ref 0–6)
FENTANYL SCREEN, URINE: NOT DETECTED
GFR AFRICAN AMERICAN: >60
GFR NON-AFRICAN AMERICAN: >60 ML/MIN/1.73
GLUCOSE BLD-MCNC: 366 MG/DL (ref 74–99)
GLUCOSE BLD-MCNC: 402 MG/DL (ref 74–99)
GLUCOSE BLD-MCNC: 423 MG/DL
GLUCOSE BLD-MCNC: 477 MG/DL (ref 74–99)
GLUCOSE URINE: 500 MG/DL
HBA1C MFR BLD: 11.5 % (ref 4–5.6)
HCO3: 7.7 MMOL/L (ref 22–26)
HCT VFR BLD CALC: 53.7 % (ref 37–54)
HEMOGLOBIN: 17.5 G/DL (ref 12.5–16.5)
HHB: 2.6 % (ref 0–5)
IMMATURE GRANULOCYTES #: 0.04 E9/L
IMMATURE GRANULOCYTES %: 0.5 % (ref 0–5)
KETONES, URINE: >=80 MG/DL
LAB: ABNORMAL
LEUKOCYTE ESTERASE, URINE: NEGATIVE
LYMPHOCYTES ABSOLUTE: 0.68 E9/L (ref 1.5–4)
LYMPHOCYTES RELATIVE PERCENT: 7.8 % (ref 20–42)
Lab: ABNORMAL
Lab: NORMAL
MAGNESIUM: 2.5 MG/DL (ref 1.6–2.6)
MCH RBC QN AUTO: 28.1 PG (ref 26–35)
MCHC RBC AUTO-ENTMCNC: 32.6 % (ref 32–34.5)
MCV RBC AUTO: 86.2 FL (ref 80–99.9)
METER GLUCOSE: 125 MG/DL (ref 74–99)
METER GLUCOSE: 169 MG/DL (ref 74–99)
METER GLUCOSE: 215 MG/DL (ref 74–99)
METER GLUCOSE: 231 MG/DL (ref 74–99)
METER GLUCOSE: 305 MG/DL (ref 74–99)
METER GLUCOSE: 325 MG/DL (ref 74–99)
METER GLUCOSE: 423 MG/DL (ref 74–99)
METER GLUCOSE: 429 MG/DL (ref 74–99)
METHADONE SCREEN, URINE: NOT DETECTED
METHB: 0.1 % (ref 0–1.5)
MODE: ABNORMAL
MONOCYTES ABSOLUTE: 0.26 E9/L (ref 0.1–0.95)
MONOCYTES RELATIVE PERCENT: 3 % (ref 2–12)
NEUTROPHILS ABSOLUTE: 7.62 E9/L (ref 1.8–7.3)
NEUTROPHILS RELATIVE PERCENT: 86.7 % (ref 43–80)
NITRITE, URINE: NEGATIVE
O2 CONTENT: 22.2 ML/DL
O2 SATURATION: 97.4 % (ref 92–98.5)
O2HB: 97 % (ref 94–97)
OPERATOR ID: 1893
OPIATE SCREEN URINE: NOT DETECTED
OXYCODONE URINE: NOT DETECTED
PATIENT TEMP: 37 C
PCO2: 22.5 MMHG (ref 35–45)
PDW BLD-RTO: 12.6 FL (ref 11.5–15)
PH BLOOD GAS: 7.15 (ref 7.35–7.45)
PH UA: 5.5 (ref 5–9)
PH VENOUS: 6.98 (ref 7.35–7.45)
PHENCYCLIDINE SCREEN URINE: NOT DETECTED
PHOSPHORUS: 2.9 MG/DL (ref 2.5–4.5)
PLATELET # BLD: 308 E9/L (ref 130–450)
PMV BLD AUTO: 10.5 FL (ref 7–12)
PO2: 106.2 MMHG (ref 75–100)
POTASSIUM REFLEX MAGNESIUM: 5.3 MMOL/L (ref 3.5–5)
POTASSIUM SERPL-SCNC: 3.4 MMOL/L (ref 3.5–5)
POTASSIUM SERPL-SCNC: 4.2 MMOL/L (ref 3.5–5)
PRO-BNP: 21 PG/ML (ref 0–125)
PROTEIN UA: 30 MG/DL
RBC # BLD: 6.23 E12/L (ref 3.8–5.8)
RBC UA: ABNORMAL /HPF (ref 0–2)
REASON FOR REJECTION: NORMAL
REJECTED TEST: NORMAL
SARS-COV-2, NAAT: NOT DETECTED
SODIUM BLD-SCNC: 132 MMOL/L (ref 132–146)
SODIUM BLD-SCNC: 135 MMOL/L (ref 132–146)
SODIUM BLD-SCNC: 139 MMOL/L (ref 132–146)
SOURCE, BLOOD GAS: ABNORMAL
SPECIFIC GRAVITY UA: >=1.03 (ref 1–1.03)
THB: 16.2 G/DL (ref 11.5–16.5)
TIME ANALYZED: 1403
TOTAL PROTEIN: 8.5 G/DL (ref 6.4–8.3)
TROPONIN, HIGH SENSITIVITY: 8 NG/L (ref 0–11)
UROBILINOGEN, URINE: 0.2 E.U./DL
WBC # BLD: 8.8 E9/L (ref 4.5–11.5)
WBC UA: ABNORMAL /HPF (ref 0–5)

## 2022-01-26 PROCEDURE — 6360000002 HC RX W HCPCS: Performed by: STUDENT IN AN ORGANIZED HEALTH CARE EDUCATION/TRAINING PROGRAM

## 2022-01-26 PROCEDURE — 96374 THER/PROPH/DIAG INJ IV PUSH: CPT

## 2022-01-26 PROCEDURE — 80048 BASIC METABOLIC PNL TOTAL CA: CPT

## 2022-01-26 PROCEDURE — 2000000000 HC ICU R&B

## 2022-01-26 PROCEDURE — 71046 X-RAY EXAM CHEST 2 VIEWS: CPT

## 2022-01-26 PROCEDURE — 81001 URINALYSIS AUTO W/SCOPE: CPT

## 2022-01-26 PROCEDURE — 80053 COMPREHEN METABOLIC PANEL: CPT

## 2022-01-26 PROCEDURE — 82010 KETONE BODYS QUAN: CPT

## 2022-01-26 PROCEDURE — 80307 DRUG TEST PRSMV CHEM ANLYZR: CPT

## 2022-01-26 PROCEDURE — 2580000003 HC RX 258: Performed by: INTERNAL MEDICINE

## 2022-01-26 PROCEDURE — 36415 COLL VENOUS BLD VENIPUNCTURE: CPT

## 2022-01-26 PROCEDURE — 83036 HEMOGLOBIN GLYCOSYLATED A1C: CPT

## 2022-01-26 PROCEDURE — 99222 1ST HOSP IP/OBS MODERATE 55: CPT | Performed by: INTERNAL MEDICINE

## 2022-01-26 PROCEDURE — 6370000000 HC RX 637 (ALT 250 FOR IP): Performed by: STUDENT IN AN ORGANIZED HEALTH CARE EDUCATION/TRAINING PROGRAM

## 2022-01-26 PROCEDURE — 99283 EMERGENCY DEPT VISIT LOW MDM: CPT

## 2022-01-26 PROCEDURE — 85378 FIBRIN DEGRADE SEMIQUANT: CPT

## 2022-01-26 PROCEDURE — 87040 BLOOD CULTURE FOR BACTERIA: CPT

## 2022-01-26 PROCEDURE — 82805 BLOOD GASES W/O2 SATURATION: CPT

## 2022-01-26 PROCEDURE — 84100 ASSAY OF PHOSPHORUS: CPT

## 2022-01-26 PROCEDURE — 84484 ASSAY OF TROPONIN QUANT: CPT

## 2022-01-26 PROCEDURE — 2580000003 HC RX 258: Performed by: STUDENT IN AN ORGANIZED HEALTH CARE EDUCATION/TRAINING PROGRAM

## 2022-01-26 PROCEDURE — 2500000003 HC RX 250 WO HCPCS: Performed by: STUDENT IN AN ORGANIZED HEALTH CARE EDUCATION/TRAINING PROGRAM

## 2022-01-26 PROCEDURE — 83735 ASSAY OF MAGNESIUM: CPT

## 2022-01-26 PROCEDURE — 87081 CULTURE SCREEN ONLY: CPT

## 2022-01-26 PROCEDURE — 85025 COMPLETE CBC W/AUTO DIFF WBC: CPT

## 2022-01-26 PROCEDURE — 87635 SARS-COV-2 COVID-19 AMP PRB: CPT

## 2022-01-26 PROCEDURE — 93005 ELECTROCARDIOGRAM TRACING: CPT | Performed by: EMERGENCY MEDICINE

## 2022-01-26 PROCEDURE — 82800 BLOOD PH: CPT

## 2022-01-26 PROCEDURE — 82962 GLUCOSE BLOOD TEST: CPT

## 2022-01-26 PROCEDURE — 83880 ASSAY OF NATRIURETIC PEPTIDE: CPT

## 2022-01-26 RX ORDER — POTASSIUM CHLORIDE 7.45 MG/ML
10 INJECTION INTRAVENOUS PRN
Status: DISCONTINUED | OUTPATIENT
Start: 2022-01-26 | End: 2022-01-28 | Stop reason: HOSPADM

## 2022-01-26 RX ORDER — ACETAMINOPHEN 325 MG/1
650 TABLET ORAL EVERY 6 HOURS PRN
Status: DISCONTINUED | OUTPATIENT
Start: 2022-01-26 | End: 2022-01-28 | Stop reason: HOSPADM

## 2022-01-26 RX ORDER — NICOTINE POLACRILEX 4 MG
15 LOZENGE BUCCAL PRN
Status: DISCONTINUED | OUTPATIENT
Start: 2022-01-26 | End: 2022-01-27

## 2022-01-26 RX ORDER — 0.9 % SODIUM CHLORIDE 0.9 %
1000 INTRAVENOUS SOLUTION INTRAVENOUS ONCE
Status: COMPLETED | OUTPATIENT
Start: 2022-01-26 | End: 2022-01-26

## 2022-01-26 RX ORDER — ACETAMINOPHEN 650 MG/1
650 SUPPOSITORY RECTAL EVERY 6 HOURS PRN
Status: DISCONTINUED | OUTPATIENT
Start: 2022-01-26 | End: 2022-01-28 | Stop reason: HOSPADM

## 2022-01-26 RX ORDER — SODIUM CHLORIDE 9 MG/ML
25 INJECTION, SOLUTION INTRAVENOUS PRN
Status: DISCONTINUED | OUTPATIENT
Start: 2022-01-26 | End: 2022-01-28 | Stop reason: HOSPADM

## 2022-01-26 RX ORDER — SODIUM CHLORIDE 0.9 % (FLUSH) 0.9 %
10 SYRINGE (ML) INJECTION EVERY 12 HOURS SCHEDULED
Status: DISCONTINUED | OUTPATIENT
Start: 2022-01-26 | End: 2022-01-28 | Stop reason: HOSPADM

## 2022-01-26 RX ORDER — SODIUM CHLORIDE 0.9 % (FLUSH) 0.9 %
10 SYRINGE (ML) INJECTION PRN
Status: DISCONTINUED | OUTPATIENT
Start: 2022-01-26 | End: 2022-01-28 | Stop reason: HOSPADM

## 2022-01-26 RX ORDER — SENNA PLUS 8.6 MG/1
1 TABLET ORAL DAILY PRN
Status: DISCONTINUED | OUTPATIENT
Start: 2022-01-26 | End: 2022-01-28 | Stop reason: HOSPADM

## 2022-01-26 RX ORDER — POTASSIUM CHLORIDE 20 MEQ/1
40 TABLET, EXTENDED RELEASE ORAL PRN
Status: DISCONTINUED | OUTPATIENT
Start: 2022-01-26 | End: 2022-01-28 | Stop reason: HOSPADM

## 2022-01-26 RX ORDER — SODIUM CHLORIDE 9 MG/ML
INJECTION, SOLUTION INTRAVENOUS CONTINUOUS
Status: DISCONTINUED | OUTPATIENT
Start: 2022-01-26 | End: 2022-01-26

## 2022-01-26 RX ORDER — ONDANSETRON 2 MG/ML
4 INJECTION INTRAMUSCULAR; INTRAVENOUS ONCE
Status: COMPLETED | OUTPATIENT
Start: 2022-01-26 | End: 2022-01-26

## 2022-01-26 RX ORDER — ONDANSETRON 2 MG/ML
4 INJECTION INTRAMUSCULAR; INTRAVENOUS EVERY 6 HOURS PRN
Status: DISCONTINUED | OUTPATIENT
Start: 2022-01-26 | End: 2022-01-28 | Stop reason: HOSPADM

## 2022-01-26 RX ORDER — DEXTROSE MONOHYDRATE 25 G/50ML
12.5 INJECTION, SOLUTION INTRAVENOUS PRN
Status: DISCONTINUED | OUTPATIENT
Start: 2022-01-26 | End: 2022-01-27

## 2022-01-26 RX ORDER — DEXTROSE MONOHYDRATE 50 MG/ML
100 INJECTION, SOLUTION INTRAVENOUS PRN
Status: DISCONTINUED | OUTPATIENT
Start: 2022-01-26 | End: 2022-01-27

## 2022-01-26 RX ORDER — ONDANSETRON 4 MG/1
4 TABLET, ORALLY DISINTEGRATING ORAL EVERY 8 HOURS PRN
Status: DISCONTINUED | OUTPATIENT
Start: 2022-01-26 | End: 2022-01-28 | Stop reason: HOSPADM

## 2022-01-26 RX ORDER — DEXTROSE AND SODIUM CHLORIDE 5; .45 G/100ML; G/100ML
INJECTION, SOLUTION INTRAVENOUS CONTINUOUS
Status: DISCONTINUED | OUTPATIENT
Start: 2022-01-26 | End: 2022-01-27

## 2022-01-26 RX ADMIN — DEXTROSE AND SODIUM CHLORIDE: 5; 450 INJECTION, SOLUTION INTRAVENOUS at 23:59

## 2022-01-26 RX ADMIN — SODIUM CHLORIDE 0.1 UNITS/KG/HR: 9 INJECTION, SOLUTION INTRAVENOUS at 15:38

## 2022-01-26 RX ADMIN — ONDANSETRON 4 MG: 2 INJECTION INTRAMUSCULAR; INTRAVENOUS at 11:16

## 2022-01-26 RX ADMIN — SODIUM CHLORIDE: 9 INJECTION, SOLUTION INTRAVENOUS at 16:55

## 2022-01-26 RX ADMIN — DEXTROSE AND SODIUM CHLORIDE: 5; 450 INJECTION, SOLUTION INTRAVENOUS at 20:05

## 2022-01-26 RX ADMIN — ENOXAPARIN SODIUM 40 MG: 100 INJECTION SUBCUTANEOUS at 20:54

## 2022-01-26 RX ADMIN — POTASSIUM CHLORIDE 40 MEQ: 20 TABLET, EXTENDED RELEASE ORAL at 23:56

## 2022-01-26 RX ADMIN — SODIUM CHLORIDE 1000 ML: 9 INJECTION, SOLUTION INTRAVENOUS at 15:07

## 2022-01-26 RX ADMIN — SODIUM BICARBONATE 50 MEQ: 84 INJECTION, SOLUTION INTRAVENOUS at 14:55

## 2022-01-26 RX ADMIN — SODIUM CHLORIDE 1000 ML: 9 INJECTION, SOLUTION INTRAVENOUS at 11:12

## 2022-01-26 ASSESSMENT — PAIN SCALES - GENERAL
PAINLEVEL_OUTOF10: 0
PAINLEVEL_OUTOF10: 0

## 2022-01-26 NOTE — ED NOTES
1406 critical ABG results PH 7.15 and bicarb 7.7 verbally told Dr Messi Torres and Omero Espinoza RN     Corky Degree, RN  01/26/22 6012

## 2022-01-26 NOTE — LETTER
[address]  [facility]  SAR[facility] [address] MED SURG  [address] [facility] [address]  Dept: [phone]  Loc: [phone]           RETURN TO WORK STATUS  1/28/2022      To whom it may concern:    Tabatha Edmonds , Roberta Dwyer, was admitted to [address] [place] [address] on January 26, 2022 and discharged on January 28, 2022. These are your [employer] Instructions. It may contain personal and confidential  information about your health. It is up to you to share  these instructions as necessary with your employer(s) as required by their policies for you to return to work.       WORK STATUS: ***    (PLEASE NOTE: If modified work is not available, this patient is then unable to work for this period of time.)

## 2022-01-26 NOTE — ED PROVIDER NOTES
Department of Emergency Medicine   ED  Provider Note  Admit Date/RoomTime: 1/26/2022  9:43 AM  ED Room: 0203/0203-A          History of Present Illness:  1/26/22, Time: 10:31 AM EST  Chief Complaint   Patient presents with    Dizziness     for a couple weeks, now feels like he is giong to pass out, weight loss     Blurred Vision    Palpitations    Shortness of Breath       Maximino Cohn is a 40 y.o. male presenting to the ED for dizziness, palpitations and shortness of breath. The patient states that he has been feeling unwell for 2 weeks. He is felt diffusely tired. Denies any focal weakness, numbness or tingling. No headache. He is noting some blurred vision when he looks far away. He is able to see up close. He states he last saw an eye doctor years ago. Denies glasses or contacts. No recent trauma. Patient is noting palpitations and shortness of breath. Seems to be worse with exertion but also while he was showering this morning. He had an episode of presyncope where he felt he might pass out. He has not lost consciousness. He denies any associated chest or abdominal pain. No history of blood clots, recent travel, leg swelling or hormone use. No abdominal pain but he is feeling some nausea. No vomiting. Patient is noting a decreased appetite. He feels the symptoms began after his COVID vaccine on January 19. The patient is also noting increased urinary frequency and polydipsia. Denies dysuria or hematuria. No history of diabetes mellitus. His mother had diabetes mellitus in her 62s but denies any childhood history in the family. No fevers. Review of Systems:   Constitutional symptoms: Denies fever. Positive for fatigue  Eyes: Denies eye pain  Ears, Nose, Mouth, Throat: Denies ear pain  Cardiovascular: Denies chest pain.   Positive for palpitations  Respiratory: Positive for shortness of breath  Gastrointestinal: Denies blood per rectum  Genitourinary: Increased urinary frequency  Skin: Denies rashes  Neurological: Denies headache. Positive for blurred vision  Musculoskeletal: Denies extremity pain    --------------------------------------------- PAST HISTORY ---------------------------------------------  Past Medical History:  has a past medical history of Pain in left knee. Past Surgical History:  has a past surgical history that includes Foot surgery (Left, as youth) and Patellar tendon repair (Left, 6/3/2019). Social History:  reports that he has never smoked. He has never used smokeless tobacco. He reports current alcohol use. He reports current drug use. Drug: Marijuana Christian Zaldivar). Family History: family history is not on file. . Unless otherwise noted, family history is non contributory    The patients home medications have been reviewed. Allergies: Patient has no known allergies. I have reviewed the past medical history, past surgical history, social history, and family history    ---------------------------------------------------PHYSICAL EXAM--------------------------------------    General: The patient is conversational and lying comfortably in bed. Head: Normocephalic and atraumatic. Eyes: Sclera non-icteric and no conjunctival injection  ENT: Nasal and oral membranes significantly dry  Neck: Trachea midline. No JVD  Respiratory: Lungs clear to auscultation bilaterally. Patient speaking in full sentences. Cardiovascular: Regular rate. No pedal edema. Gastrointestinal:  Abdomen is soft and nondistended. Bowel sounds present. There is no tenderness. There is no guarding or rebound. Musculoskeletal: No deformity  Skin: Skin warm and dry. No rashes. Neurologic: No gross motor deficits. No aphasia. Pupils are equal and reactive to light. Extraocular eye movements intact. Sensation intact bilaterally. Symmetric facies. Tongue protrudes midline. 5 out of 5 symmetric strength of the upper and lower extremities. Negative finger-to-nose testing. Alert and oriented person, place and time. No meningismus. Patient able to finger count in all 4 quadrants  Psychiatric: Normal affect.    -------------------------------------------------- RESULTS -------------------------------------------------  I have personally reviewed all laboratory and imaging results for this patient. Results are listed below.      LABS: (Lab results interpreted by me)  Results for orders placed or performed during the hospital encounter of 01/26/22   COVID-19, Rapid    Specimen: Nasopharyngeal Swab   Result Value Ref Range    SARS-CoV-2, NAAT Not Detected Not Detected   CBC Auto Differential   Result Value Ref Range    WBC 8.8 4.5 - 11.5 E9/L    RBC 6.23 (H) 3.80 - 5.80 E12/L    Hemoglobin 17.5 (H) 12.5 - 16.5 g/dL    Hematocrit 53.7 37.0 - 54.0 %    MCV 86.2 80.0 - 99.9 fL    MCH 28.1 26.0 - 35.0 pg    MCHC 32.6 32.0 - 34.5 %    RDW 12.6 11.5 - 15.0 fL    Platelets 924 057 - 849 E9/L    MPV 10.5 7.0 - 12.0 fL    Neutrophils % 86.7 (H) 43.0 - 80.0 %    Immature Granulocytes % 0.5 0.0 - 5.0 %    Lymphocytes % 7.8 (L) 20.0 - 42.0 %    Monocytes % 3.0 2.0 - 12.0 %    Eosinophils % 1.5 0.0 - 6.0 %    Basophils % 0.5 0.0 - 2.0 %    Neutrophils Absolute 7.62 (H) 1.80 - 7.30 E9/L    Immature Granulocytes # 0.04 E9/L    Lymphocytes Absolute 0.68 (L) 1.50 - 4.00 E9/L    Monocytes Absolute 0.26 0.10 - 0.95 E9/L    Eosinophils Absolute 0.13 0.05 - 0.50 E9/L    Basophils Absolute 0.04 0.00 - 0.20 E9/L   Urinalysis with Microscopic   Result Value Ref Range    Color, UA Yellow Straw/Yellow    Clarity, UA Clear Clear    Glucose, Ur 500 (A) Negative mg/dL    Bilirubin Urine Negative Negative    Ketones, Urine >=80 (A) Negative mg/dL    Specific Gravity, UA >=1.030 1.005 - 1.030    Blood, Urine SMALL (A) Negative    pH, UA 5.5 5.0 - 9.0    Protein, UA 30 (A) Negative mg/dL    Urobilinogen, Urine 0.2 <2.0 E.U./dL    Nitrite, Urine Negative Negative    Leukocyte Esterase, Urine Negative Negative    WBC, UA NONE 0 - 5 /HPF    RBC, UA 1-3 0 - 2 /HPF    Bacteria, UA NONE SEEN None Seen /HPF   pH, venous   Result Value Ref Range    pH, Rodrigo 6.98 (LL) 7.35 - 7.45   D-dimer, quantitative   Result Value Ref Range    D-Dimer, Quant <200 ng/mL DDU   Troponin   Result Value Ref Range    Troponin, High Sensitivity 8 0 - 11 ng/L   Comprehensive Metabolic Panel w/ Reflex to MG   Result Value Ref Range    Sodium 135 132 - 146 mmol/L    Potassium reflex Magnesium 5.3 (H) 3.5 - 5.0 mmol/L    Chloride 96 (L) 98 - 107 mmol/L    CO2 10 (L) 22 - 29 mmol/L    Anion Gap 29 (H) 7 - 16 mmol/L    Glucose 477 (H) 74 - 99 mg/dL    BUN 15 6 - 20 mg/dL    CREATININE 1.1 0.7 - 1.2 mg/dL    GFR Non-African American >60 >=60 mL/min/1.73    GFR African American >60     Calcium 9.2 8.6 - 10.2 mg/dL    Total Protein 8.5 (H) 6.4 - 8.3 g/dL    Albumin 4.9 3.5 - 5.2 g/dL    Total Bilirubin 0.3 0.0 - 1.2 mg/dL    Alkaline Phosphatase 108 40 - 129 U/L    ALT 13 0 - 40 U/L    AST 14 0 - 39 U/L   Brain Natriuretic Peptide   Result Value Ref Range    Pro-BNP 21 0 - 125 pg/mL   Beta-Hydroxybutyrate   Result Value Ref Range    Beta-Hydroxybutyrate >4.50 (H) 0.02 - 0.27 mmol/L   SPECIMEN REJECTION   Result Value Ref Range    Rejected Test Dimer, Trop, BN     Reason for Rejection see below    Blood Gas, Arterial   Result Value Ref Range    Date Analyzed [id number]     Time Analyzed 1403     Source: Blood Arterial     pH, Blood Gas 7.152 (LL) 7.350 - 7.450    PCO2 22.5 (L) 35.0 - 45.0 mmHg    PO2 106.2 (H) 75.0 - 100.0 mmHg    HCO3 7.7 (L) 22.0 - 26.0 mmol/L    B.E. -19.1 (L) -3.0 - 3.0 mmol/L    O2 Sat 97.4 92.0 - 98.5 %    O2Hb 97.0 94.0 - 97.0 %    COHb 0.3 0.0 - 1.5 %    MetHb 0.1 0.0 - 1.5 %    O2 Content 22.2 mL/dL    HHb 2.6 0.0 - 5.0 %    tHb (est) 16.2 11.5 - 16.5 g/dL    Mode RA     Comment with readback     Date Of Collection      Time Collected      Pt Temp 37.0 C     ID 1893     Lab [id number]     Critical(s) Notified Called to Noa Suresh RN Hemoglobin A1c   Result Value Ref Range    Hemoglobin A1C 11.5 (H) 4.0 - 5.6 %   Basic metabolic panel   Result Value Ref Range    Sodium 132 132 - 146 mmol/L    Potassium 3.4 (L) 3.5 - 5.0 mmol/L    Chloride 104 98 - 107 mmol/L    CO2 16 (L) 22 - 29 mmol/L    Anion Gap 12 7 - 16 mmol/L    Glucose 402 (H) 74 - 99 mg/dL    BUN 9 6 - 20 mg/dL    CREATININE 0.9 0.7 - 1.2 mg/dL    GFR Non-African American >60 >=60 mL/min/1.73    GFR African American >60     Calcium 7.5 (L) 8.6 - 10.2 mg/dL   Basic metabolic panel   Result Value Ref Range    Sodium 139 132 - 146 mmol/L    Potassium 4.2 3.5 - 5.0 mmol/L    Chloride 103 98 - 107 mmol/L    CO2 11 (L) 22 - 29 mmol/L    Anion Gap 25 (H) 7 - 16 mmol/L    Glucose 366 (H) 74 - 99 mg/dL    BUN 13 6 - 20 mg/dL    CREATININE 1.1 0.7 - 1.2 mg/dL    GFR Non-African American >60 >=60 mL/min/1.73    GFR African American >60     Calcium 8.1 (L) 8.6 - 10.2 mg/dL   Basic metabolic panel   Result Value Ref Range    Sodium 138 132 - 146 mmol/L    Potassium 4.6 3.5 - 5.0 mmol/L    Chloride 109 (H) 98 - 107 mmol/L    CO2 18 (L) 22 - 29 mmol/L    Anion Gap 11 7 - 16 mmol/L    Glucose 166 (H) 74 - 99 mg/dL    BUN 9 6 - 20 mg/dL    CREATININE 1.0 0.7 - 1.2 mg/dL    GFR Non-African American >60 >=60 mL/min/1.73    GFR African American >60     Calcium 8.4 (L) 8.6 - 10.2 mg/dL   URINE DRUG SCREEN   Result Value Ref Range    Amphetamine Screen, Urine NOT DETECTED Negative <1000 ng/mL    Barbiturate Screen, Ur NOT DETECTED Negative < 200 ng/mL    Benzodiazepine Screen, Urine NOT DETECTED Negative < 200 ng/mL    Cannabinoid Scrn, Ur NOT DETECTED Negative < 50ng/mL    Cocaine Metabolite Screen, Urine NOT DETECTED Negative < 300 ng/mL    Opiate Scrn, Ur NOT DETECTED Negative < 300ng/mL    PCP Screen, Urine NOT DETECTED Negative < 25 ng/mL    Methadone Screen, Urine NOT DETECTED Negative <300 ng/mL    Oxycodone Urine NOT DETECTED Negative <100 ng/mL    FENTANYL SCREEN, URINE NOT DETECTED Negative <1 ng/mL    Drug Screen Comment: see below    Magnesium   Result Value Ref Range    Magnesium 2.5 1.6 - 2.6 mg/dL   Phosphorus   Result Value Ref Range    Phosphorus 2.9 2.5 - 4.5 mg/dL   Comprehensive Metabolic Panel w/ Reflex to MG   Result Value Ref Range    Sodium 138 132 - 146 mmol/L    Potassium reflex Magnesium 3.8 3.5 - 5.0 mmol/L    Chloride 108 (H) 98 - 107 mmol/L    CO2 20 (L) 22 - 29 mmol/L    Anion Gap 10 7 - 16 mmol/L    Glucose 214 (H) 74 - 99 mg/dL    BUN 8 6 - 20 mg/dL    CREATININE 1.0 0.7 - 1.2 mg/dL    GFR Non-African American >60 >=60 mL/min/1.73    GFR African American >60     Calcium 8.4 (L) 8.6 - 10.2 mg/dL    Total Protein 6.5 6.4 - 8.3 g/dL    Albumin 4.0 3.5 - 5.2 g/dL    Total Bilirubin 0.4 0.0 - 1.2 mg/dL    Alkaline Phosphatase 74 40 - 129 U/L    ALT 10 0 - 40 U/L    AST 13 0 - 39 U/L   CBC auto differential   Result Value Ref Range    WBC 5.6 4.5 - 11.5 E9/L    RBC 4.59 3.80 - 5.80 E12/L    Hemoglobin 13.0 12.5 - 16.5 g/dL    Hematocrit 38.6 37.0 - 54.0 %    MCV 84.1 80.0 - 99.9 fL    MCH 28.3 26.0 - 35.0 pg    MCHC 33.7 32.0 - 34.5 %    RDW 12.8 11.5 - 15.0 fL    Platelets 469 114 - 405 E9/L    MPV 10.2 7.0 - 12.0 fL    Neutrophils % 63.5 43.0 - 80.0 %    Immature Granulocytes % 0.2 0.0 - 5.0 %    Lymphocytes % 23.4 20.0 - 42.0 %    Monocytes % 9.6 2.0 - 12.0 %    Eosinophils % 2.9 0.0 - 6.0 %    Basophils % 0.4 0.0 - 2.0 %    Neutrophils Absolute 3.56 1.80 - 7.30 E9/L    Immature Granulocytes # 0.01 E9/L    Lymphocytes Absolute 1.31 (L) 1.50 - 4.00 E9/L    Monocytes Absolute 0.54 0.10 - 0.95 E9/L    Eosinophils Absolute 0.16 0.05 - 0.50 E9/L    Basophils Absolute 0.02 0.00 - 0.20 E9/L   Magnesium   Result Value Ref Range    Magnesium 2.4 1.6 - 2.6 mg/dL   Phosphorus   Result Value Ref Range    Phosphorus 1.3 (L) 2.5 - 4.5 mg/dL   POCT glucose   Result Value Ref Range    Glucose 423 mg/dL   POCT Glucose   Result Value Ref Range    Meter Glucose 429 (H) 74 - 99 mg/dL   POCT Glucose Result Value Ref Range    Meter Glucose 423 (H) 74 - 99 mg/dL   POCT Glucose   Result Value Ref Range    Meter Glucose 325 (H) 74 - 99 mg/dL   POCT Glucose   Result Value Ref Range    Meter Glucose 305 (H) 74 - 99 mg/dL   POCT Glucose   Result Value Ref Range    Meter Glucose 231 (H) 74 - 99 mg/dL   POCT Glucose   Result Value Ref Range    Meter Glucose 169 (H) 74 - 99 mg/dL   POCT Glucose   Result Value Ref Range    Meter Glucose 215 (H) 74 - 99 mg/dL   POCT Glucose   Result Value Ref Range    Meter Glucose 125 (H) 74 - 99 mg/dL   POCT Glucose   Result Value Ref Range    Meter Glucose 145 (H) 74 - 99 mg/dL   POCT Glucose   Result Value Ref Range    Meter Glucose 262 (H) 74 - 99 mg/dL   POCT Glucose   Result Value Ref Range    Meter Glucose 139 (H) 74 - 99 mg/dL   POCT Glucose   Result Value Ref Range    Meter Glucose 146 (H) 74 - 99 mg/dL   POCT Glucose   Result Value Ref Range    Meter Glucose 188 (H) 74 - 99 mg/dL   POCT Glucose   Result Value Ref Range    Meter Glucose 221 (H) 74 - 99 mg/dL   POCT Glucose   Result Value Ref Range    Meter Glucose 223 (H) 74 - 99 mg/dL   EKG 12 Lead   Result Value Ref Range    Ventricular Rate 111 BPM    Atrial Rate 111 BPM    P-R Interval 144 ms    QRS Duration 90 ms    Q-T Interval 332 ms    QTc Calculation (Bazett) 451 ms    P Axis 75 degrees    R Axis 68 degrees    T Axis 65 degrees   ,     RADIOLOGY:  Interpreted by Radiologist unless otherwise specified  XR CHEST (2 VW)   Final Result   No pneumonia or pleural effusion.             ------------------------- NURSING NOTES AND VITALS REVIEWED ---------------------------   The nursing notes within the ED encounter and vital signs as below have been reviewed by myself  /82   Pulse 57   Temp 98.6 °F (37 °C) (Oral)   Resp 12   Ht 6' (1.829 m)   Wt 176 lb (79.8 kg)   SpO2 100%   BMI 23.87 kg/m²     Oxygen Saturation Interpretation: Normal    The patients available past medical records and past encounters were reviewed.       ------------------------------ ED COURSE/MEDICAL DECISION MAKING----------------------  Medications   insulin regular (HUMULIN R;NOVOLIN R) 100 Units in sodium chloride 0.9 % 100 mL infusion (3.22 Units/hr IntraVENous Rate/Dose Change 1/27/22 0600)   sodium chloride flush 0.9 % injection 10 mL (10 mLs IntraVENous Not Given 1/27/22 0716)   sodium chloride flush 0.9 % injection 10 mL (has no administration in time range)   0.9 % sodium chloride infusion (has no administration in time range)   potassium chloride (KLOR-CON M) extended release tablet 40 mEq (40 mEq Oral Given 1/26/22 2356)     Or   potassium bicarb-citric acid (EFFER-K) effervescent tablet 40 mEq ( Oral See Alternative 1/26/22 2356)     Or   potassium chloride 10 mEq/100 mL IVPB (Peripheral Line) ( IntraVENous See Alternative 1/26/22 2356)   enoxaparin (LOVENOX) injection 40 mg (40 mg SubCUTAneous Given 1/26/22 2054)   ondansetron (ZOFRAN-ODT) disintegrating tablet 4 mg (has no administration in time range)     Or   ondansetron (ZOFRAN) injection 4 mg (has no administration in time range)   senna (SENOKOT) tablet 8.6 mg (has no administration in time range)   acetaminophen (TYLENOL) tablet 650 mg (650 mg Oral Given 1/27/22 0905)     Or   acetaminophen (TYLENOL) suppository 650 mg ( Rectal See Alternative 1/27/22 0905)   dextrose 5 % and 0.45 % sodium chloride infusion ( IntraVENous Rate/Dose Verify 1/27/22 0000)   glucose (GLUTOSE) 40 % oral gel 15 g (has no administration in time range)   dextrose 50 % IV solution (has no administration in time range)   glucagon (rDNA) injection 1 mg (has no administration in time range)   dextrose 5 % solution (has no administration in time range)   insulin glargine (LANTUS) injection vial 18 Units (has no administration in time range)   insulin lispro (HUMALOG) injection vial 0-12 Units (has no administration in time range)   insulin lispro (HUMALOG) injection vial 0-6 Units (has no administration in time range)   0.9 % sodium chloride bolus (0 mLs IntraVENous Stopped 1/26/22 1707)   0.9 % sodium chloride bolus (0 mLs IntraVENous Stopped 1/26/22 1330)   ondansetron (ZOFRAN) injection 4 mg (4 mg IntraVENous Given 1/26/22 1116)   sodium bicarbonate 8.4 % injection 50 mEq (50 mEq IntraVENous Given 1/26/22 1455)       Medical Decision Making: This is a 40 y.o. male presenting to the ED for multiple complaints. On initial presentation, the patient's vital signs are interpreted as hypertensive, tachycardic, afebrile and saturating well on room air. Based on history and physical exam, we have a broad differential.  Patient symptoms are concerning for new onset diabetes mellitus or DKA. I considered ACS, arrhythmia, pneumonia, and urinary tract infection. Patient denies history of trauma and has a nonfocal neurological exam.  As he is tachycardic, I cannot exclude PE although he is low risk Wells. D-dimer will be obtained. Patient symptoms began after COVID vaccine it is possible that he is having side effects secondary to this as well as acute Covid infection. We will obtain chest x-ray, EKG, laboratory studies. Patient will be given Zofran for his nausea and hydrated with 2 L of normal saline. Abdominal exam soft and nonsurgical.    Point-of-care glucose 429. A 12-lead EKG was performed and interpreted by myself. The rate is 111 with sinus tachycardia and normal axis. The WV interval is 144, QRS interval is 90, and QTC interval is 451. Q waves in lead II, III and aVF. No acute ST elevation or depression. Good R wave progression. This is sinus tachycardia with nonspecific abnormality. Chest x-ray shows no pneumonia or pleural effusion. This is interpreted by radiology. The patient is hyperglycemic at 429. This is new onset diabetes mellitus. Urinalysis shows glucosuria and ketonuria but no urinary tract infection. The patient's pH is 6.98.   As this is significantly acidotic and I have a high suspicion for DKA the patient will be given an amp of bicarb. His laboratory studies to further have hemolyzed. Patient is mildly hyperkalemic at 5.3. Acidotic bicarb 10. Anion gap 29. Hyperglycemic 477. Patient will be started on an insulin drip and DKA protocol. BMP within normal limits. Troponin within normal limits. Total protein mildly elevated. Beta hydroxybutyrate significantly elevated. D-dimer negative. Covid negative. Patient's arterial blood gas shows metabolic acidosis. Patient's glucose is improving. Repeat electrolytes showed continued anion gap but is somewhat improving at 25. Acidosis mildly improving at 11. Patient remains hyperglycemic. Insulin drip discontinued. Patient will require ICU level care. Primary care physician is Dr. Margret Fisher who admits to Dr. Lacie Alas. Dr. Rehan Carlin accepted the patient. Sarah also contacted. Patient agreeable to admission. Resting comfortably and ambulatory. Upon my evaluation, this patient had a high probability of imminent or life-threatening deterioration due to dka, which required my direct attention, intervention, and personal management. I have personally provided 35 minutes of critical care time excluding time spent on separately billable procedures. Time includes review of laboratory data, radiology results, discussion with consultants, and monitoring for potential decompensation. Interventions were performed as documented above. This patient's ED course included: a personal history and physicial examination, re-evaluation prior to disposition, IV medications, cardiac monitoring and continuous pulse oximetry    This patient has been closely monitored during their ED course. Consultations:  Internal medicine, critical care    The cardiac monitor revealed sinus tachycardia with a heart rate in the 100s as interpreted by me.  The cardiac monitor was ordered secondary to the patient's dizziness and to monitor the patient for dysrhythmia. CPT [id number]    Oxygen Saturation Interpretation: 100 % on room air. Normal    Counseling:   I have spoken with the patient and discussed todays results, in addition to providing specific details for the plan of care and counseling regarding the diagnosis and prognosis. Questions are answered at this time and they are agreeable with the plan.     --------------------------------- IMPRESSION AND DISPOSITION ---------------------------------    IMPRESSION  1. Diabetes mellitus, new onset ([facility]    2. Diabetic ketoacidosis without coma associated with type [facility] mellitus [facility]    3. Dizziness        DISPOSITION  Disposition: Admit to CCU/ICU  Patient condition is fair    IDr. Dillon, am the primary provider of record    NOTE: This report was transcribed using voice recognition software.  Every effort was made to ensure accuracy; however, inadvertent computerized transcription errors may be present        Dillon Cuevas MD  01/27/22 3848

## 2022-01-26 NOTE — LETTER
[address]  [facility]  [facility] [address] [facility]  [address] [address]  Dept: [phone]  Loc: [phone]           RETURN TO WORK STATUS  1/28/2022      To whom it may concern:    Girish Perez , Susan Clements, was admitted to [address] [place] [address] on January 26, 2022 and discharged on January 28, 2022. These are your [id number] Instructions. It may contain personal and confidential  information about your health. It is up to you to share  these instructions as necessary with your employer(s) as required by their policies for you to return to work.       WORK STATUS: {[facility] WORK AVQFUP:[id number]}    (PLEASE NOTE: If modified work is not available, this patient is then unable to work for this period of time.)          Deyanira Rossi RN

## 2022-01-26 NOTE — CARE COORDINATION
Called by ER for admission of DKA   New onset DM?   Check viral PCR   A1C  Admit to ICU   Admission orders placed   Will see patient in AM   Call with issues

## 2022-01-26 NOTE — CONSULTS
Critical Care Team: Daily Progress Note         Date and time: 1/26/2022 4:56 PM    Patient's name:  [id number] [facility] Record Number: [id number]    Patient's account/billing number: [de-identified]    Patient's YOB: 1975    Age: 40 y.o. Date of Admission: 1/26/2022  9:43 AM    Length of stay during current admission: 0    Primary Care Physician: Liang Harrison MD    Previous Pulmonary: N/A    Code Status: Full Code    PMH:    Past Medical History:   Diagnosis Date    Pain in left knee 05/2019       PSH:   Past Surgical History:   Procedure Laterality Date    FOOT SURGERY Left as youth    PATELLAR TENDON REPAIR Left 6/3/2019    LEFT LEG PATELLA TENDON REPAIR performed by Enrrique Vo MD at [address]       Reason for ICU admission:   1. DKA    Subjective:     Lizet Gonzalez is a 41 [facility] with no significant past medical history noted that presented to [facility] for weakness and nausea. Patient found to be in DKA. The patient states that he has been feeling unwell for 2 weeks. He is felt diffusely tired. Denies any focal weakness, numbness or tingling. No headache. He is noting some blurred vision when he looks far away. He is able to see up close. He states he last saw an eye doctor years ago. Denies glasses or contacts. No recent trauma. Patient is noting palpitations and shortness of breath. Seems to be worse with exertion but also while he was showering this morning. He had an episode of presyncope where he felt he might pass out. He has not lost consciousness. He denies any associated chest or abdominal pain. No history of blood clots, recent travel, leg swelling or hormone use. No abdominal pain but he is feeling some nausea. No vomiting. Patient is noting a decreased appetite. He feels the symptoms began after his COVID vaccine on January 19. The patient is also noting increased urinary frequency and polydipsia.   Denies dysuria or hematuria. No history of diabetes mellitus. His mother had diabetes mellitus in her 62s but denies any childhood history in the family. No fevers. In ER patient had pH 7.15, pCO2 22.5, HCO3 10, BHB > 4.5, glc 4677. HS trop 8.0. Current ventilation:     [] Ventilator  [] BIPAP/AVAPS  [] Nasal Cannula [x] Room Air      Secretions      Amount:  [] Small [] Moderate  _ Large  [x] None  Color:     - White - Colored  - Bloody    Sedation:  RAAS Score:  [] Propofol gtt  [] Fentanyl gtt  [] Ativan gtt   [x] No Sedation    Paralyzed?:  [x] No    [] Yes    Vasopressors:  [x] No    [] Yes    If yes -   [] Levophed       [] Dopamine     [] Vasopressin       [] Dobutamine  [] Phenylephrine         [] Epinephrine    Central line:     [x] No   [] Yes         If yes -       [] Right IJ     [] Left IJ [] Right Femoral [] Left Femoral                   [] Right Subclavian [] Left Subclavian     Mckay catheter:   [x] No   [] Yes     Urine output:            [x] Good   [] Low              [] Anuric      Objective:     Vital signs:  BP (!) 141/87   Pulse 93   Temp 98.1 °F (36.7 °C)   Resp 16   Ht 6' (1.829 m)   Wt 176 lb (79.8 kg)   SpO2 99%   BMI 23.87 kg/m²   Tmax over 24 hours:  Temp (24hrs), Av.1 °F (36.7 °C), Min:98.1 °F (36.7 °C), Max:98.1 °F (36.7 °C)      Patient Vitals for the past 6 hrs:   BP Pulse Resp SpO2   22 1536 (!) 141/87 93 16 99 %         Intake/Output Summary (Last 24 hours) at 2022 1656  Last data filed at 2022 1330  Gross per 24 hour   Intake 1000 ml   Output --   Net 1000 ml     Wt Readings from Last 2 Encounters:   22 176 lb (79.8 kg)   20 200 lb (90.7 kg)     Body mass index is 23.87 kg/m². Physical examination:    General: No distress. Eyes: PERRL. No sclera icterus. No conjunctival injection. ENT: No discharge. Pharynx clear. Neck: Trachea midline. Normal thyroid. Resp: No accessory muscle use. No rales. No wheezing. No rhonchi. CV: Regular rate. Regular rhythm. No murmur or rub. Abd: Non-tender. Non-distended. No masses. No organmegaly. Normal bowel sounds. Skin: Warm and dry. No nodules on exposed extremities. No rash on exposed extremities. Ext: No cyanosis, clubbing, edema  Lymph: No cervical LAD. No supraclavicular LAD. M/S: No cyanosis. No joint deformity. No clubbing. Neuro: Positive pupils/gag/corneals. Normal pain response.     Medications:    Scheduled Meds:   sodium chloride  1,000 mL IntraVENous Once    sodium chloride flush  10 mL IntraVENous 2 times per day    enoxaparin  40 mg SubCUTAneous Daily     Continuous Infusions:   dextrose      insulin 0.1 Units/kg/hr (01/26/22 1538)    sodium chloride      sodium chloride 200 mL/hr at 01/26/22 1655     PRN Meds:   glucose, 15 g, PRN  dextrose, 12.5 g, PRN  glucagon (rDNA), 1 mg, PRN  dextrose, 100 mL/hr, PRN  sodium chloride flush, 10 mL, PRN  sodium chloride, 25 mL, PRN  potassium chloride, 40 mEq, PRN   Or  potassium alternative oral replacement, 40 mEq, PRN   Or  potassium chloride, 10 mEq, PRN  ondansetron, 4 mg, Q8H PRN   Or  ondansetron, 4 mg, Q6H PRN  senna, 1 tablet, Daily PRN  acetaminophen, 650 mg, Q6H PRN   Or  acetaminophen, 650 mg, Q6H PRN          Vent Settings (Comprehensive) (if applicable):  Vent Information  SpO2: 99 %  Additional Respiratory  Assessments  Pulse: 93  Resp: 16  SpO2: 99 %    ABGs:   Recent Labs     01/26/22  1403   PH 7.152*   PCO2 22.5*   PO2 106.2*   HCO3 7.7*   BE -19.1*   O2SAT 97.4       Laboratory findings:    Complete Blood Count:   Recent Labs     01/26/22  1037   WBC 8.8   HGB 17.5*   HCT 53.7           Last 3 Blood Glucose:   Recent Labs     01/26/22  1332 01/26/22  1508   GLUCOSE 477* 423        PT/INR:  No results found for: PROTIME, INR  PTT:  No results found for: APTT, PTT    Comprehensive Metabolic Profile:   Recent Labs     01/26/22  1332 01/26/22  1508     --    K 5.3*  --    CL 96*  --    CO2 10*  --    BUN 15  -- CREATININE 1.1  --    GLUCOSE 477* 423   CALCIUM 9.2  --    PROT 8.5*  --    LABALBU 4.9  --    BILITOT 0.3  --    ALKPHOS 108  --    AST 14  --    ALT 13  --       Magnesium: No results found for: MG  Phosphorus: No results found for: PHOS  Ionized Calcium: No results found for: CAION     Urinalysis:     Troponin: No results for input(s): TROPONINI in the last 72 hours. Microbiology past 24h:    Blood cultures:                 [] None drawn      [] Negative             []  Positive (Details:  )  Urine Culture:                   [] None drawn      [] Negative             []  Positive (Details:  )  Sputum Culture:               [] None drawn       [] Negative             []  Positive (Details:  )   Endotracheal aspirate:     [] None drawn       [] Negative             []  Positive (Details:  )     Other Pertinent Labs and Pathology Results:   1. Radiology/Imaging:     XR CHEST (2 VW)   Final Result   No pneumonia or pleural effusion. Assessment:     1.) Diabetic Ketoacidosis - recent [address] vaccination  2.) Hyperkalemia    3.) Anion Gap Metabolic Acidosis  4.) Diabetic Gastroparesis     Plan:     Wean per protocol:  [] No   [] Yes  [x] N/A    ICU Prophylaxis:  Stress ulcer:  [] PPI Agent  [] [id number] [] Sucralfate  [] Other:  VTE:   [] Enoxaparin  [] Unfract. Heparin Subcut  [] EPC Cuffs    Nutrition:  [] NPO [] Tube Feeding (Specify: ) [] TPN  [x] PO (Diet: Diet NPO)    Home medications reconciled: [] No  [x] Yes    Insulin drip:   [] No   [x] Yes    Family updated:    [] No   [x] Yes    Transfer Candidate?:   [x] No   [] Yes    - DKA protocol  - blood culture x 2, urine drug screen, Hgb A1C  - endocrinology consultation     Thank you very much for allowing me to see this patient in consultation and follow up. Care reviewed with nursing staff, medical and surgical specialty care, primary care and the patient's family as available.  Restraints are ordered when the patient can do harm to him/herself by pulling out devices.     Critical care time spent reviewing labs/films, examining patient, collaborating with other physicians but excluding procedures for life threatening organ failure is:    Critical Care Time: > 35 minutes excluding procedures     Nicolás Mullen M.D.  1/26/2022  4:56 PM

## 2022-01-27 PROBLEM — E11.9 DIABETES MELLITUS, NEW ONSET (HCC): Status: ACTIVE | Noted: 2022-01-27

## 2022-01-27 PROBLEM — E11.10 DKA (DIABETIC KETOACIDOSIS) (HCC): Status: ACTIVE | Noted: 2022-01-26

## 2022-01-27 PROBLEM — E86.0 DEHYDRATION: Status: ACTIVE | Noted: 2022-01-27

## 2022-01-27 PROBLEM — E87.29 HIGH ANION GAP METABOLIC ACIDOSIS: Status: ACTIVE | Noted: 2022-01-27

## 2022-01-27 LAB
ALBUMIN SERPL-MCNC: 4 G/DL (ref 3.5–5.2)
ALP BLD-CCNC: 74 U/L (ref 40–129)
ALT SERPL-CCNC: 10 U/L (ref 0–40)
ANION GAP SERPL CALCULATED.3IONS-SCNC: 10 MMOL/L (ref 7–16)
ANION GAP SERPL CALCULATED.3IONS-SCNC: 11 MMOL/L (ref 7–16)
AST SERPL-CCNC: 13 U/L (ref 0–39)
BASOPHILS ABSOLUTE: 0.02 E9/L (ref 0–0.2)
BASOPHILS RELATIVE PERCENT: 0.4 % (ref 0–2)
BILIRUB SERPL-MCNC: 0.4 MG/DL (ref 0–1.2)
BUN BLDV-MCNC: 8 MG/DL (ref 6–20)
BUN BLDV-MCNC: 9 MG/DL (ref 6–20)
CALCIUM SERPL-MCNC: 8.4 MG/DL (ref 8.6–10.2)
CALCIUM SERPL-MCNC: 8.4 MG/DL (ref 8.6–10.2)
CHLORIDE BLD-SCNC: 108 MMOL/L (ref 98–107)
CHLORIDE BLD-SCNC: 109 MMOL/L (ref 98–107)
CO2: 18 MMOL/L (ref 22–29)
CO2: 20 MMOL/L (ref 22–29)
CREAT SERPL-MCNC: 1 MG/DL (ref 0.7–1.2)
CREAT SERPL-MCNC: 1 MG/DL (ref 0.7–1.2)
EOSINOPHILS ABSOLUTE: 0.16 E9/L (ref 0.05–0.5)
EOSINOPHILS RELATIVE PERCENT: 2.9 % (ref 0–6)
GFR AFRICAN AMERICAN: >60
GFR AFRICAN AMERICAN: >60
GFR NON-AFRICAN AMERICAN: >60 ML/MIN/1.73
GFR NON-AFRICAN AMERICAN: >60 ML/MIN/1.73
GLUCOSE BLD-MCNC: 166 MG/DL (ref 74–99)
GLUCOSE BLD-MCNC: 214 MG/DL (ref 74–99)
HCT VFR BLD CALC: 38.6 % (ref 37–54)
HEMOGLOBIN: 13 G/DL (ref 12.5–16.5)
IMMATURE GRANULOCYTES #: 0.01 E9/L
IMMATURE GRANULOCYTES %: 0.2 % (ref 0–5)
LYMPHOCYTES ABSOLUTE: 1.31 E9/L (ref 1.5–4)
LYMPHOCYTES RELATIVE PERCENT: 23.4 % (ref 20–42)
MAGNESIUM: 2.4 MG/DL (ref 1.6–2.6)
MCH RBC QN AUTO: 28.3 PG (ref 26–35)
MCHC RBC AUTO-ENTMCNC: 33.7 % (ref 32–34.5)
MCV RBC AUTO: 84.1 FL (ref 80–99.9)
METER GLUCOSE: 139 MG/DL (ref 74–99)
METER GLUCOSE: 145 MG/DL (ref 74–99)
METER GLUCOSE: 146 MG/DL (ref 74–99)
METER GLUCOSE: 188 MG/DL (ref 74–99)
METER GLUCOSE: 188 MG/DL (ref 74–99)
METER GLUCOSE: 221 MG/DL (ref 74–99)
METER GLUCOSE: 223 MG/DL (ref 74–99)
METER GLUCOSE: 238 MG/DL (ref 74–99)
METER GLUCOSE: 262 MG/DL (ref 74–99)
METER GLUCOSE: 307 MG/DL (ref 74–99)
METER GLUCOSE: 335 MG/DL (ref 74–99)
METER GLUCOSE: 356 MG/DL (ref 74–99)
MONOCYTES ABSOLUTE: 0.54 E9/L (ref 0.1–0.95)
MONOCYTES RELATIVE PERCENT: 9.6 % (ref 2–12)
NEUTROPHILS ABSOLUTE: 3.56 E9/L (ref 1.8–7.3)
NEUTROPHILS RELATIVE PERCENT: 63.5 % (ref 43–80)
PDW BLD-RTO: 12.8 FL (ref 11.5–15)
PHOSPHORUS: 1.3 MG/DL (ref 2.5–4.5)
PLATELET # BLD: 222 E9/L (ref 130–450)
PMV BLD AUTO: 10.2 FL (ref 7–12)
POTASSIUM REFLEX MAGNESIUM: 3.8 MMOL/L (ref 3.5–5)
POTASSIUM SERPL-SCNC: 4.6 MMOL/L (ref 3.5–5)
PROCALCITONIN: 0.11 NG/ML (ref 0–0.08)
RBC # BLD: 4.59 E12/L (ref 3.8–5.8)
SODIUM BLD-SCNC: 138 MMOL/L (ref 132–146)
SODIUM BLD-SCNC: 138 MMOL/L (ref 132–146)
TOTAL PROTEIN: 6.5 G/DL (ref 6.4–8.3)
WBC # BLD: 5.6 E9/L (ref 4.5–11.5)

## 2022-01-27 PROCEDURE — 84100 ASSAY OF PHOSPHORUS: CPT

## 2022-01-27 PROCEDURE — 2580000003 HC RX 258: Performed by: INTERNAL MEDICINE

## 2022-01-27 PROCEDURE — 6370000000 HC RX 637 (ALT 250 FOR IP): Performed by: STUDENT IN AN ORGANIZED HEALTH CARE EDUCATION/TRAINING PROGRAM

## 2022-01-27 PROCEDURE — 6360000002 HC RX W HCPCS: Performed by: INTERNAL MEDICINE

## 2022-01-27 PROCEDURE — 84145 PROCALCITONIN (PCT): CPT

## 2022-01-27 PROCEDURE — 1200000000 HC SEMI PRIVATE

## 2022-01-27 PROCEDURE — 83735 ASSAY OF MAGNESIUM: CPT

## 2022-01-27 PROCEDURE — 99232 SBSQ HOSP IP/OBS MODERATE 35: CPT | Performed by: INTERNAL MEDICINE

## 2022-01-27 PROCEDURE — 85025 COMPLETE CBC W/AUTO DIFF WBC: CPT

## 2022-01-27 PROCEDURE — 6370000000 HC RX 637 (ALT 250 FOR IP): Performed by: INTERNAL MEDICINE

## 2022-01-27 PROCEDURE — 80048 BASIC METABOLIC PNL TOTAL CA: CPT

## 2022-01-27 PROCEDURE — 36415 COLL VENOUS BLD VENIPUNCTURE: CPT

## 2022-01-27 PROCEDURE — 80053 COMPREHEN METABOLIC PANEL: CPT

## 2022-01-27 PROCEDURE — 82962 GLUCOSE BLOOD TEST: CPT

## 2022-01-27 RX ORDER — INSULIN GLARGINE 100 [IU]/ML
18 INJECTION, SOLUTION SUBCUTANEOUS DAILY
Status: DISCONTINUED | OUTPATIENT
Start: 2022-01-27 | End: 2022-01-28 | Stop reason: HOSPADM

## 2022-01-27 RX ORDER — PEN NEEDLE, DIABETIC 32 GX 1/4"
NEEDLE, DISPOSABLE MISCELLANEOUS
Qty: 250 EACH | Refills: 5 | Status: SHIPPED | OUTPATIENT
Start: 2022-01-27

## 2022-01-27 RX ORDER — LANCETS
EACH MISCELLANEOUS
Qty: 250 EACH | Refills: 5 | Status: SHIPPED | OUTPATIENT
Start: 2022-01-27

## 2022-01-27 RX ORDER — NICOTINE POLACRILEX 4 MG
15 LOZENGE BUCCAL PRN
Status: DISCONTINUED | OUTPATIENT
Start: 2022-01-27 | End: 2022-01-28 | Stop reason: HOSPADM

## 2022-01-27 RX ORDER — GLUCOSAMINE HCL/CHONDROITIN SU 500-400 MG
CAPSULE ORAL
Qty: 250 STRIP | Refills: 5 | Status: SHIPPED | OUTPATIENT
Start: 2022-01-27

## 2022-01-27 RX ORDER — DEXTROSE MONOHYDRATE 25 G/50ML
12.5 INJECTION, SOLUTION INTRAVENOUS PRN
Status: DISCONTINUED | OUTPATIENT
Start: 2022-01-27 | End: 2022-01-28 | Stop reason: HOSPADM

## 2022-01-27 RX ORDER — INSULIN LISPRO 100 [IU]/ML
INJECTION, SOLUTION INTRAVENOUS; SUBCUTANEOUS
Qty: 5 PEN | Refills: 3 | Status: SHIPPED | OUTPATIENT
Start: 2022-01-27

## 2022-01-27 RX ORDER — DEXTROSE MONOHYDRATE 50 MG/ML
100 INJECTION, SOLUTION INTRAVENOUS PRN
Status: DISCONTINUED | OUTPATIENT
Start: 2022-01-27 | End: 2022-01-28 | Stop reason: HOSPADM

## 2022-01-27 RX ORDER — INSULIN GLARGINE 100 [IU]/ML
20 INJECTION, SOLUTION SUBCUTANEOUS EVERY MORNING
Qty: 5 PEN | Refills: 3 | Status: SHIPPED | OUTPATIENT
Start: 2022-01-27

## 2022-01-27 RX ADMIN — POTASSIUM & SODIUM PHOSPHATES POWDER PACK 280-160-250 MG 500 MG: 280-160-250 PACK at 16:08

## 2022-01-27 RX ADMIN — ACETAMINOPHEN 650 MG: 325 TABLET ORAL at 09:05

## 2022-01-27 RX ADMIN — INSULIN LISPRO 2 UNITS: 100 INJECTION, SOLUTION INTRAVENOUS; SUBCUTANEOUS at 21:46

## 2022-01-27 RX ADMIN — INSULIN LISPRO 4 UNITS: 100 INJECTION, SOLUTION INTRAVENOUS; SUBCUTANEOUS at 12:09

## 2022-01-27 RX ADMIN — ENOXAPARIN SODIUM 40 MG: 100 INJECTION SUBCUTANEOUS at 21:43

## 2022-01-27 RX ADMIN — POTASSIUM & SODIUM PHOSPHATES POWDER PACK 280-160-250 MG 500 MG: 280-160-250 PACK at 10:47

## 2022-01-27 RX ADMIN — SODIUM CHLORIDE, PRESERVATIVE FREE 10 ML: 5 INJECTION INTRAVENOUS at 21:50

## 2022-01-27 RX ADMIN — INSULIN LISPRO 4 UNITS: 100 INJECTION, SOLUTION INTRAVENOUS; SUBCUTANEOUS at 08:09

## 2022-01-27 RX ADMIN — INSULIN LISPRO 8 UNITS: 100 INJECTION, SOLUTION INTRAVENOUS; SUBCUTANEOUS at 17:59

## 2022-01-27 RX ADMIN — INSULIN GLARGINE 18 UNITS: 100 INJECTION, SOLUTION SUBCUTANEOUS at 09:00

## 2022-01-27 ASSESSMENT — PAIN SCALES - GENERAL
PAINLEVEL_OUTOF10: 0
PAINLEVEL_OUTOF10: 0
PAINLEVEL_OUTOF10: 4
PAINLEVEL_OUTOF10: 0

## 2022-01-27 NOTE — H&P
Internal Medicine History & Physical     Name: Austin Ellington  : 1975  Chief Complaint: Dizziness (for a couple weeks, now feels like he is giong to pass out, weight loss ), Blurred Vision, Palpitations, and Shortness of Breath  Primary Care Physician: Dalton Eastman MD  Admission date: 2022  Date of service: 2022     History of Present Illness  Haley Cormier is a 40y.o. year old male. He presented to the hospital with a chief complaint of dizziness. Recently the patient was also experiencing weight loss, blurry vision, nausea, frequent urination, increased thirst and constipation. He states that the symptoms have been going on for a few weeks. He thought that they might be related to him recently receiving the [facility] vaccination for COVID-19. His symptoms got worse yesterday and for this reason he went to the emergency department. He just started a new job and his insurance does not start until February according to the patient. He considers himself as healthy. Nothing in particular seem to make his symptoms better or worse. Overall symptoms are moderate in severity. The patient's wife was present at bedside. History is provided by the patient and his wife. Both are felt to be good historians. ED course:   Initial blood work and imaging studies performed. Admission recommended by ED physician. Dr. Rosemarie Ma discussed with ED provider.  Meds in ED consisted of the following:   Date/Time Order Dose Route Action Action by Comments    2022 1507 0.9 % sodium chloride bolus 1,000 mL IntraVENous [address]     2022 1112 0.9 % sodium chloride bolus 1,000 mL IntraVENous [address] Fanny Mason, RN     2022 1330 0.9 % sodium chloride bolus 0 mL IntraVENous Stopped Mordecai Bloch, MATTHEW     2022 1538 insulin regular (HUMULIN R;NOVOLIN R) 100 Units in sodium chloride 0.9 % 100 mL infusion 0.1 Units/kg/hr IntraVENous [facility], RN     2022 1116 ondansetron (ZOFRAN) injection 4 mg 4 mg IntraVENous Given Ena Bishop RN     01/26/2022 1450 sodium bicarbonate 8.4 % injection 50 mEq 50 mEq IntraVENous Given Tatyana Rose RN        Past Medical History:   Diagnosis Date    Pain in left knee 05/2019       Past Surgical History:   Procedure Laterality Date    FOOT SURGERY Left as youth    PATELLAR TENDON REPAIR Left 6/3/2019    LEFT LEG PATELLA TENDON REPAIR performed by Renée Wan MD at [address] [place] [address] History:  Family History   Problem Relation Age of Onset    Diabetes Mother     Hypertension Father        Social History  Patient lives at home with his wife  Employment:  in a car dealership  Illicit drug use- denies  TOBACCO:   reports that he has never smoked. He has never used smokeless tobacco.  ETOH:   reports current alcohol use. Home Medications  Prior to Admission medications    Medication Sig Start Date End Date Taking? Authorizing Provider   sildenafil (REVATIO) 20 MG tablet Take 2 tablets by mouth daily as needed (erectile dysfunction) 5/12/20   Tony Driscoll MD   Multiple Vitamins-Minerals (THERAPEUTIC MULTIVITAMIN-MINERALS) tablet Take 1 tablet by mouth daily Ld 05/29/2019    Historical Provider, MD       Allergies  No Known Allergies    Review of Systems:  Please see HPI above. All bolded are positive. All un-bolded are negative.   Constitutional Symptoms: fever, chills, fatigue, generalized weakness, diaphoresis, increase in thirst, loss of appetite  Eyes: vision change   Ears, Nose, Mouth, Throat: hearing loss, nasal congestion, sores in the mouth  Cardiovascular: chest pain, chest heaviness, palpitations  Respiratory: shortness of breath, wheezing, coughing  Gastrointestinal: abdominal pain, nausea, vomiting, diarrhea, constipation, melena, hematochezia, hematemesis  Genitourinary: dysuria, hematuria, increased frequency  Musculoskeletal: lower extremity edema, myalgias, arthralgias, back pain  Integumentary: rashes, itching   Neurological: headache, lightheadedness, dizziness, confusion, syncope, numbness, tingling, focal weakness  Psychiatric: depression, suicidal ideation, anxiety  Endocrine: unintentional weight change  Hematologic/Lymphatic: lymphadenopathy, easy bruising, easy bleeding   Allergic/Immunologic: recurrent infections      Objective  VITALS:  /82   Pulse 57   Temp 98.6 °F (37 °C) (Oral)   Resp 12   Ht 6' (1.829 m)   Wt 176 lb (79.8 kg)   SpO2 100%   BMI 23.87 kg/m²     Physical Exam:   General: awake, alert, oriented to person, place, time, and purpose, appears stated age, cooperative, no acute distress, pleasant, appropriate mood  Eyes: conjunctivae/corneas clear, sclera non icteric, EOMI  Ears: no obvious scars, no lesions, no masses, hearing intact  Mouth: mucous membranes moist, no obvious oral sores  Head: normocephalic, atraumatic  Neck: no JVD, no adenopathy, no thyromegaly, neck is supple, trachea is midline  Back: ROM normal, no CVA tenderness.   Chest: no pain on palpation  Lungs: clear to auscultation bilaterally, without rhonchi, crackle, wheezing, or rale, no retractions or use of accessory muscles  Heart: Bradycardic rate and regular rhythm, no murmur, normal S1, S2  Abdomen: soft, non-tender; bowel sounds normal; no masses, no organomegaly  : Deferred   Extremities: no lower extremity edema, extremities atraumatic, no cyanosis, no clubbing, 2+ pedal pulses palpated  Skin: normal color, normal texture, normal turgor, no rashes, no lesions  Neurologic:5/5 muscle strength throughout, normal muscle tone throughout, face symmetric, hearing intact, tongue midline, speech appropriate without slurring, sensation to fine touch intact in upper and lower extremities    Labs-   Lab Results   Component Value Date    WBC 5.6 01/27/2022    HGB 13.0 01/27/2022    HCT 38.6 01/27/2022     01/27/2022     01/27/2022    K 3.8 01/27/2022     (H) 01/27/2022 CREATININE 1.0 01/27/2022    BUN 8 01/27/2022    CO2 20 (L) 01/27/2022    GLUCOSE 214 (H) 01/27/2022    ALT 10 01/27/2022    AST 13 01/27/2022     No results found for: CKTOTAL, CKMB, CKMBINDEX, TROPONINI    Echocardiogram:2018   Normal left ventricle size and systolic function. Ejection fraction is visually estimated at 55%. No regional wall motion abnormalities seen. Right ventricle global systolic function is normal .   Mild mitral regurgitation is present. No hemodynamically significant aortic stenosis is present. Mild tricuspid regurgitation. Normal PA systolic pressure. Mild pulmonic regurgitation present. No evidence for hemodynamically significant pericardial effusion. No previous echo for comparison. Recent Radiological Studies:  XR CHEST (2 VW)   Final Result   No pneumonia or pleural effusion. Assessment  Active Hospital Problems    Diagnosis     DKA (diabetic ketoacidosis) ([facility] [address] [E11.10]      Priority: High    Diabetes mellitus, new onset ([facility] [address] [E11.9]      Priority: Medium    Dehydration [E86.0]      Priority: Medium    High anion gap metabolic acidosis [Z47.1]      Priority: Medium       Patient Active Problem List    Diagnosis Date Noted    DKA (diabetic ketoacidosis) ([address] 01/26/2022     Priority: High    Diabetes mellitus, new onset ([address] 01/27/2022     Priority: Medium    Dehydration 01/27/2022     Priority: Medium    High anion gap metabolic acidosis 10/48/9991     Priority: Medium       Plan  · Resolved DKA/elevated anion gap metabolic acidosis/severe dehydration w/ newly diagnosed DM -- likely LODY:   · ICU monitoring. Critical care following. · Endocrinology following and managing insulins. · HbA1c 11.5%. · Oral diet started  · Consult diabetic educator and dietitian. · Nursing to teach the patient how to give himself insulin  · IVF hydration. · Follow BMP/mag/phos/lactic acid.    · Replace electrolytes as needed  · He needs to see his PCP in the next 2 weeks  · He needs to set up an ophthalmology evaluation as an outpatient  · Continue home medications  · Follow labs  · DVT prophylaxis. · Please see orders for further management and care. ·  for discharge planning  · Discharge plan: Possibly home as soon as later [email or web address]    Arjun Sasha DO  1/27/2022  10:10 AM    I can be reached through [employer] ([employer]). NOTE:  This report was transcribed using voice recognition software. Every effort was made to ensure accuracy; however, inadvertent computerized transcription errors may be present.

## 2022-01-27 NOTE — CARE COORDINATION
Pt admit ICU for DKA. Was on insulin drip, has since been weaned. New onset for diabetes. Await endocrinology input, diabetic education needs to see pt. Pt has no insurance until beginning of February. Pt lives with spouse in ranch home. Independent prior to admit. PCP is Dr Juliann Robert. Plan is home. PT WILL NEED GLUCOMETER SUPPLIED THROUGH DIABETIC ED, also, PT WILL NEED SCRIPTS FOR INSULIN. Will ask of they can write for short term until insurance is active. Pt is not eligible for help with meds at this time.  Will follow-o

## 2022-01-27 NOTE — PROGRESS NOTES
Resp 16   Ht 6' (1.829 m)   Wt 176 lb (79.8 kg)   SpO2 99%   BMI 23.87 kg/m²     Intake/Output Summary (Last 24 hours) at 1/27/2022 1849  Last data filed at 1/27/2022 0200  Gross per 24 hour   Intake 1736 ml   Output 1000 ml   Net 736 ml       Physical examination:  General: awake alert, oriented x3  HEENT: normocephalic non traumatic, no exophthalmos   Neck: supple, No thyroid tenderness,  Pulm: good equal air entry no added sounds  CVS: S1 + S2  Abd: soft lax, no tenderness  Skin: warm, no lesions, no rash. No open wounds, no ulcers   Neuro: CN intact, sensation decreased bilateral , muscle power normal  Psych: normal mood, and affect    Review of Laboratory Data:  I personally reviewed the following labs:   Recent Labs     01/26/22  1037 01/27/22  0803   WBC 8.8 5.6   RBC 6.23* 4.59   HGB 17.5* 13.0   HCT 53.7 38.6   MCV 86.2 84.1   MCH 28.1 28.3   MCHC 32.6 33.7   RDW 12.6 12.8    222   MPV 10.5 10.2     Recent Labs     01/26/22  1332 01/26/22  1508 01/26/22  2203 01/27/22  0210 01/27/22  0803      < > 132 138 138   K 5.3*   < > 3.4* 4.6 3.8   CL 96*   < > 104 109* 108*   CO2 10*   < > 16* 18* 20*   BUN 15   < > 9 9 8   CREATININE 1.1   < > 0.9 1.0 1.0   GLUCOSE 477*   < > 402* 166* 214*   CALCIUM 9.2   < > 7.5* 8.4* 8.4*   PROT 8.5*  --   --   --  6.5   LABALBU 4.9  --   --   --  4.0   BILITOT 0.3  --   --   --  0.4   ALKPHOS 108  --   --   --  74   AST 14  --   --   --  13   ALT 13  --   --   --  10    < > = values in this interval not displayed.      Beta-Hydroxybutyrate   Date Value Ref Range Status   01/26/2022 >4.50 (H) 0.02 - 0.27 mmol/L Final     Lab Results   Component Value Date    LABA1C 11.5 01/26/2022     No results found for: TSH, T4FREE, G8LNVLF, FT3, J6MJVSH, TSI, TPOABS, THGAB  Lab Results   Component Value Date    LABA1C 11.5 01/26/2022    GLUCOSE 214 01/27/2022     No results found for: TRIG, HDL, LDLCALC, CHOL    Blood culture   No results found for: [facility]    Radiology:  XR CHEST (2 VW)   Final Result   No pneumonia or pleural effusion. Medical Records/Labs/Images review:   I personally reviewed and summarized previous records   All labs and imaging were reviewed independently     Leonel Molina, a 40 y.o.-old male seen today for inpatient diabetes management     Newly diagnosed Diabetes Mellitus  · Admitted with DKA   · We recommend the following DM regimen  · Lantus 18u daily  · Humalog 6u with meals   · Medium dose sliding scale   · Will help sq transition once DKA resolve   · Continue glucose check with meals and at bedtime   · Pt will follow with us after discharge. Endocrine follow up visit, Monday 1/31 at 2:30pm     Hyperkalemia   · Improved     The above issues were reviewed with the patient who understood and agreed with the plan. Thank you for allowing us to participate in the care of this patient. Please do not hesitate to contact us with any additional questions. Davidson Patiño MD  Endocrinologist, [facility] Diabetes Care and Endocrinology   [address] [facility] [address]   Phone: [phone]  Fax: [phone]  --------------------------------------------  An electronic signature was used to authenticate this note.  José Genao MD on 1/27/2022 at 6:49 PM

## 2022-01-27 NOTE — CONSULTS
ENDOCRINOLOGY INITIAL CONSULTATION NOTE      Date of admission: 1/26/2022  Date of service: 1/26/2022  Admitting physician: Miley Khanna MD   Primary Care Physician: Jordan Urrutia MD  Consultant physician: Laurel Gauthier MD     Reason for the consultation:  Uncontrolled DM    History of Present Illness: The history is provided by the patient. Accuracy of the patient data is excellent    Alexys Neumann is a very pleasant 40 y.o. old male with no significant PMH listed below admitted to [facility] on 1/26/2022 because of nausea and weakness and found to be in DKA, endocrine service was consulted for diabetes management. The patient was in his usual state of health until about two weeks ago when started c/o polyuria, polydipsia, nausea and weakness. He denies h/o fever, chills, CP, SOB, palpitation or sick contact   Admission labs , ag 29, Bicarb 10, Cr 1.1, K 5.3       Prior to admission  no h/o DM   No results found for: LABA1C    Inpatient diet:   NPO while on insulin drip     Point of care glucose monitoring   (Independently reviewed)   Recent Labs     01/26/22  1033 01/26/22  1456 01/26/22  1723 01/26/22  1822   GLUMET 429* 423* 325* 305*       Past medical history:   Past Medical History:   Diagnosis Date    Pain in left knee 05/2019       Past surgical history:  Past Surgical History:   Procedure Laterality Date    FOOT SURGERY Left as youth    PATELLAR TENDON REPAIR Left 6/3/2019    LEFT LEG PATELLA TENDON REPAIR performed by Wilma Mims MD at [address] [place] [address] history:   Tobacco:   reports that he has never smoked. He has never used smokeless tobacco.  Alcohol:   reports current alcohol use. Drugs:   reports current drug use. Drug: Marijuana [address] Free). Family history:    No family history on file.     Allergy and drug reactions:   No Known Allergies    Scheduled Meds:   sodium chloride flush  10 mL IntraVENous 2 times per day    enoxaparin  40 mg SubCUTAneous Daily       PRN Meds:   glucose, 15 g, PRN  dextrose, 12.5 g, PRN  glucagon (rDNA), 1 mg, PRN  dextrose, 100 mL/hr, PRN  sodium chloride flush, 10 mL, PRN  sodium chloride, 25 mL, PRN  potassium chloride, 40 mEq, PRN   Or  potassium alternative oral replacement, 40 mEq, PRN   Or  potassium chloride, 10 mEq, PRN  ondansetron, 4 mg, Q8H PRN   Or  ondansetron, 4 mg, Q6H PRN  senna, 1 tablet, Daily PRN  acetaminophen, 650 mg, Q6H PRN   Or  acetaminophen, 650 mg, Q6H PRN      Continuous Infusions:   dextrose      insulin 0.075 Units/kg/hr (01/26/22 1830)    sodium chloride      sodium chloride 200 mL/hr at 01/26/22 1655       Review of Systems  All systems reviewed. All negative except for symptoms mentioned in HPI     OBJECTIVE    /74   Pulse 88   Temp 98.4 °F (36.9 °C) (Axillary)   Resp 13   Ht 6' (1.829 m)   Wt 176 lb (79.8 kg)   SpO2 100%   BMI 23.87 kg/m²     Intake/Output Summary (Last 24 hours) at 1/26/2022 1945  Last data filed at 1/26/2022 1330  Gross per 24 hour   Intake 1000 ml   Output --   Net 1000 ml       Physical examination:  General: awake alert, oriented x3  HEENT: normocephalic non traumatic, no exophthalmos   Neck: supple, No thyroid tenderness,  Pulm: good equal air entry no added sounds  CVS: S1 + S2  Abd: soft lax, no tenderness  Skin: warm, no lesions, no rash.  No open wounds, no ulcers   Neuro: CN intact, sensation decreased bilateral , muscle power normal  Psych: normal mood, and affect    Review of Laboratory Data:  I personally reviewed the following labs:   Recent Labs     01/26/22  1037   WBC 8.8   RBC 6.23*   HGB 17.5*   HCT 53.7   MCV 86.2   MCH 28.1   MCHC 32.6   RDW 12.6      MPV 10.5     Recent Labs     01/26/22  1332 01/26/22  1508 01/26/22  1729     --  139   K 5.3*  --  4.2   CL 96*  --  103   CO2 10*  --  11*   BUN 15  --  13   CREATININE 1.1  --  1.1   GLUCOSE 477* 423 366*   CALCIUM 9.2  --  8.1*   PROT 8.5*  --   --    LABALBU 4.9  --   --    BILITOT 0.3  --   --    ALKPHOS 108  -- --    AST 14  --   --    ALT 13  --   --      Beta-Hydroxybutyrate   Date Value Ref Range Status   01/26/2022 >4.50 (H) 0.02 - 0.27 mmol/L Final     No results found for: LABA1C  No results found for: TSH, T4FREE, O6EQFEG, FT3, O2QYNAD, TSI, TPOABS, THGAB  Lab Results   Component Value Date    GLUCOSE 366 01/26/2022     No results found for: TRIG, HDL, LDLCALC, CHOL    Blood culture   No results found for: [facility]    Radiology:  XR CHEST (2 VW)   Final Result   No pneumonia or pleural effusion. Medical Records/Labs/Images review:   I personally reviewed and summarized previous records   All labs and imaging were reviewed independently     Leonel Molina, a 40 y.o.-old male seen today for inpatient diabetes management     Newly diagnosed Diabetes Mellitus  · Admitted with DKA   · currently on insulin drip as per DKA protocol   · Will help sq transition once DKA resolve   · Continue glucose check with meals and at bedtime   · Will arrange for patient to be seen in endocrinology clinic upon discharge for routine diabetes maintenance and prevention. Hyperkalemia   · Improved     The above issues were reviewed with the patient who understood and agreed with the plan. Thank you for allowing us to participate in the care of this patient. Please do not hesitate to contact us with any additional questions. Natali Lezama MD  Endocrinologist, [facility]   [address] [place] [id number]   Phone: [phone]  Fax: [phone]  --------------------------------------------  An electronic signature was used to authenticate this note.  Noe Ng MD on 1/26/2022 at 7:45 PM

## 2022-01-27 NOTE — PROGRESS NOTES
Critical Care Team: Daily Progress Note         Date and time: 1/27/2022 9:27 AM    Patient's name:  [id number] [facility] Record Number: [id number]    Patient's account/billing number: [de-identified]    Patient's YOB: 1975    Age: 40 y.o. Date of Admission: 1/26/2022  9:43 AM    Length of stay during current admission: 1    Primary Care Physician: Wero Pemberton MD    Previous Pulmonary: N/A    Code Status: Full Code    PMH:    Past Medical History:   Diagnosis Date    Pain in left knee 05/2019       PSH:   Past Surgical History:   Procedure Laterality Date    FOOT SURGERY Left as youth    PATELLAR TENDON REPAIR Left 6/3/2019    LEFT LEG PATELLA TENDON REPAIR performed by Janessa Sahni MD at [address] [facility]       Reason for ICU admission:   1. DKA    Subjective:     Lizet Gonzalez is a 41 y/o [facility] American male with no significant past medical history noted that presented to [facility] for weakness and nausea. Patient found to be in DKA. The patient states that he has been feeling unwell for 2 weeks. He is felt diffusely tired. Denies any focal weakness, numbness or tingling. No headache. He is noting some blurred vision when he looks far away. He is able to see up close. He states he last saw an eye doctor years ago. Denies glasses or contacts. No recent trauma. Patient is noting palpitations and shortness of breath. Seems to be worse with exertion but also while he was showering this morning. He had an episode of presyncope where he felt he might pass out. He has not lost consciousness. He denies any associated chest or abdominal pain. No history of blood clots, recent travel, leg swelling or hormone use. No abdominal pain but he is feeling some nausea. No vomiting. Patient is noting a decreased appetite. He feels the symptoms began after his COVID vaccine on January 19. The patient is also noting increased urinary frequency and polydipsia.   Denies dysuria or hematuria. No history of diabetes mellitus. His mother had diabetes mellitus in her 62s but denies any childhood history in the family. No fevers. In ER patient had pH 7.15, pCO2 22.5, HCO3 10, BHB > 4.5, glc 4677. HS trop 8.0.    : off insulin drip, lantus 178 units, Hgb A1C 11.5    Current ventilation:     [] Ventilator  [] BIPAP/AVAPS  [] Nasal Cannula [x] Room Air      Secretions      Amount:  [] Small [] Moderate  _ Large  [x] None  Color:     - White - Colored  - Bloody    Sedation:  RAAS Score:  [] Propofol gtt  [] Fentanyl gtt  [] Ativan gtt   [x] No Sedation    Paralyzed?:  [x] No    [] Yes    Vasopressors:  [x] No    [] Yes    If yes -   [] Levophed       [] Dopamine     [] Vasopressin       [] Dobutamine  [] Phenylephrine         [] Epinephrine    Central line:     [x] No   [] Yes         If yes -       [] Right IJ     [] Left IJ [] Right Femoral [] Left Femoral                   [] Right Subclavian [] Left Subclavian     Mckay catheter:   [x] No   [] Yes     Urine output:            [x] Good   [] Low              [] Anuric      Objective:     Vital signs:  /82   Pulse 57   Temp 98.6 °F (37 °C) (Oral)   Resp 12   Ht 6' (1.829 m)   Wt 176 lb (79.8 kg)   SpO2 100%   BMI 23.87 kg/m²   Tmax over 24 hours:  Temp (24hrs), Av.5 °F (36.9 °C), Min:98.3 °F (36.8 °C), Max:98.6 °F (37 °C)      Patient Vitals for the past 6 hrs:   BP Temp Temp src Pulse Resp SpO2   22 0600 116/82 -- -- 57 12 100 %   22 0500 114/76 -- -- 62 12 99 %   22 0400 128/80 98.6 °F (37 °C) Oral 64 16 99 %         Intake/Output Summary (Last 24 hours) at 2022 0927  Last data filed at 2022 0200  Gross per 24 hour   Intake 2736 ml   Output 1000 ml   Net 1736 ml     Wt Readings from Last 2 Encounters:   22 176 lb (79.8 kg)   20 200 lb (90.7 kg)     Body mass index is 23.87 kg/m². Physical examination:    General: No distress. Eyes: PERRL. No sclera icterus.  No conjunctival injection. ENT: No discharge. Pharynx clear. Neck: Trachea midline. Normal thyroid. Resp: No accessory muscle use. No rales. No wheezing. No rhonchi. CV: Regular rate. Regular rhythm. No murmur or rub. Abd: Non-tender. Non-distended. No masses. No organmegaly. Normal bowel sounds. Skin: Warm and dry. No nodules on exposed extremities. No rash on exposed extremities. Ext: No cyanosis, clubbing, edema  Lymph: No cervical LAD. No supraclavicular LAD. M/S: No cyanosis. No joint deformity. No clubbing. Neuro: Positive pupils/gag/corneals. Normal pain response.     Medications:    Scheduled Meds:   insulin glargine  18 Units SubCUTAneous Daily    insulin lispro  0-12 Units SubCUTAneous TID WC    insulin lispro  0-6 Units SubCUTAneous Nightly    sodium chloride flush  10 mL IntraVENous 2 times per day    enoxaparin  40 mg SubCUTAneous Daily     Continuous Infusions:   dextrose      insulin 3.22 Units/hr (01/27/22 0600)    sodium chloride      dextrose 5 % and 0.45 % NaCl 150 mL/hr at 01/27/22 0000     PRN Meds:   glucose, 15 g, PRN  dextrose, 12.5 g, PRN  glucagon (rDNA), 1 mg, PRN  dextrose, 100 mL/hr, PRN  sodium chloride flush, 10 mL, PRN  sodium chloride, 25 mL, PRN  potassium chloride, 40 mEq, PRN   Or  potassium alternative oral replacement, 40 mEq, PRN   Or  potassium chloride, 10 mEq, PRN  ondansetron, 4 mg, Q8H PRN   Or  ondansetron, 4 mg, Q6H PRN  senna, 1 tablet, Daily PRN  acetaminophen, 650 mg, Q6H PRN   Or  acetaminophen, 650 mg, Q6H PRN          Vent Settings (Comprehensive) (if applicable):  Vent Information  Skin Assessment: Clean, dry, & intact  SpO2: 100 %  Additional Respiratory  Assessments  Pulse: 57  Resp: 12  SpO2: 100 %    ABGs:   Recent Labs     01/26/22  1403   PH 7.152*   PCO2 22.5*   PO2 106.2*   HCO3 7.7*   BE -19.1*   O2SAT 97.4       Laboratory findings:    Complete Blood Count:   Recent Labs     01/26/22  1037 01/27/22  0803   WBC 8.8 5.6   HGB 17.5* 13.0   HCT 53.7 Enoxaparin  [] Unfract. Heparin Subcut  [] EPC Cuffs    Nutrition:  [] NPO [] Tube Feeding (Specify: ) [] TPN  [x] PO (Diet: ADULT DIET; Regular; 4 carb choices (60 gm/meal))    Home medications reconciled: [] No  [x] Yes    Insulin drip:   [] No   [x] Yes    Family updated:    [] No   [x] Yes    Transfer Candidate?:   [x] No   [] Yes    - DKA protocol, off insulin  - lantsu 18 units   - endocrinology consultation   - await transfer out of ICU or potential D/C home if ok with primary team     Thank you very much for allowing me to see this patient in consultation and follow up. Care reviewed with nursing staff, medical and surgical specialty care, primary care and the patient's family as available. Restraints are ordered when the patient can do harm to him/herself by pulling out devices.     Critical care time spent reviewing labs/films, examining patient, collaborating with other physicians but excluding procedures for life threatening organ failure is:    Nicolás Mullen M.D.  1/27/2022  9:27 AM

## 2022-01-28 VITALS
TEMPERATURE: 98.4 F | OXYGEN SATURATION: 99 % | WEIGHT: 177 LBS | RESPIRATION RATE: 15 BRPM | SYSTOLIC BLOOD PRESSURE: 138 MMHG | HEART RATE: 79 BPM | DIASTOLIC BLOOD PRESSURE: 80 MMHG | HEIGHT: 72 IN | BODY MASS INDEX: 23.98 KG/M2

## 2022-01-28 LAB
ALBUMIN SERPL-MCNC: 3.5 G/DL (ref 3.5–5.2)
ALP BLD-CCNC: 70 U/L (ref 40–129)
ALT SERPL-CCNC: 11 U/L (ref 0–40)
ANION GAP SERPL CALCULATED.3IONS-SCNC: 11 MMOL/L (ref 7–16)
AST SERPL-CCNC: 14 U/L (ref 0–39)
BASOPHILS ABSOLUTE: 0.03 E9/L (ref 0–0.2)
BASOPHILS RELATIVE PERCENT: 0.7 % (ref 0–2)
BILIRUB SERPL-MCNC: 0.3 MG/DL (ref 0–1.2)
BUN BLDV-MCNC: 8 MG/DL (ref 6–20)
CALCIUM SERPL-MCNC: 8.8 MG/DL (ref 8.6–10.2)
CHLORIDE BLD-SCNC: 107 MMOL/L (ref 98–107)
CO2: 21 MMOL/L (ref 22–29)
CREAT SERPL-MCNC: 0.9 MG/DL (ref 0.7–1.2)
EOSINOPHILS ABSOLUTE: 0.16 E9/L (ref 0.05–0.5)
EOSINOPHILS RELATIVE PERCENT: 3.6 % (ref 0–6)
GFR AFRICAN AMERICAN: >60
GFR NON-AFRICAN AMERICAN: >60 ML/MIN/1.73
GLUCOSE BLD-MCNC: 156 MG/DL (ref 74–99)
HCT VFR BLD CALC: 37.6 % (ref 37–54)
HEMOGLOBIN: 12.6 G/DL (ref 12.5–16.5)
IMMATURE GRANULOCYTES #: 0.01 E9/L
IMMATURE GRANULOCYTES %: 0.2 % (ref 0–5)
LYMPHOCYTES ABSOLUTE: 2.23 E9/L (ref 1.5–4)
LYMPHOCYTES RELATIVE PERCENT: 50.5 % (ref 20–42)
MAGNESIUM: 1.8 MG/DL (ref 1.6–2.6)
MCH RBC QN AUTO: 28 PG (ref 26–35)
MCHC RBC AUTO-ENTMCNC: 33.5 % (ref 32–34.5)
MCV RBC AUTO: 83.6 FL (ref 80–99.9)
METER GLUCOSE: 206 MG/DL (ref 74–99)
MONOCYTES ABSOLUTE: 0.3 E9/L (ref 0.1–0.95)
MONOCYTES RELATIVE PERCENT: 6.8 % (ref 2–12)
MRSA CULTURE ONLY: NORMAL
NEUTROPHILS ABSOLUTE: 1.69 E9/L (ref 1.8–7.3)
NEUTROPHILS RELATIVE PERCENT: 38.2 % (ref 43–80)
PDW BLD-RTO: 12.6 FL (ref 11.5–15)
PHOSPHORUS: 2 MG/DL (ref 2.5–4.5)
PLATELET # BLD: 203 E9/L (ref 130–450)
PMV BLD AUTO: 10.1 FL (ref 7–12)
POTASSIUM SERPL-SCNC: 3.6 MMOL/L (ref 3.5–5)
RBC # BLD: 4.5 E12/L (ref 3.8–5.8)
SODIUM BLD-SCNC: 139 MMOL/L (ref 132–146)
TOTAL PROTEIN: 6 G/DL (ref 6.4–8.3)
WBC # BLD: 4.4 E9/L (ref 4.5–11.5)

## 2022-01-28 PROCEDURE — 6370000000 HC RX 637 (ALT 250 FOR IP): Performed by: INTERNAL MEDICINE

## 2022-01-28 PROCEDURE — 83735 ASSAY OF MAGNESIUM: CPT

## 2022-01-28 PROCEDURE — 80053 COMPREHEN METABOLIC PANEL: CPT

## 2022-01-28 PROCEDURE — 2580000003 HC RX 258: Performed by: INTERNAL MEDICINE

## 2022-01-28 PROCEDURE — 36415 COLL VENOUS BLD VENIPUNCTURE: CPT

## 2022-01-28 PROCEDURE — 85025 COMPLETE CBC W/AUTO DIFF WBC: CPT

## 2022-01-28 PROCEDURE — 84100 ASSAY OF PHOSPHORUS: CPT

## 2022-01-28 PROCEDURE — 82962 GLUCOSE BLOOD TEST: CPT

## 2022-01-28 PROCEDURE — 99232 SBSQ HOSP IP/OBS MODERATE 35: CPT | Performed by: INTERNAL MEDICINE

## 2022-01-28 RX ORDER — ATORVASTATIN CALCIUM 10 MG/1
10 TABLET, FILM COATED ORAL DAILY
Qty: 90 TABLET | Refills: 1 | Status: SHIPPED | OUTPATIENT
Start: 2022-01-28

## 2022-01-28 RX ORDER — LISINOPRIL 5 MG/1
5 TABLET ORAL DAILY
Qty: 30 TABLET | Refills: 0 | Status: SHIPPED | OUTPATIENT
Start: 2022-01-28

## 2022-01-28 RX ADMIN — SODIUM CHLORIDE, PRESERVATIVE FREE 10 ML: 5 INJECTION INTRAVENOUS at 08:39

## 2022-01-28 RX ADMIN — INSULIN LISPRO 6 UNITS: 100 INJECTION, SOLUTION INTRAVENOUS; SUBCUTANEOUS at 11:34

## 2022-01-28 RX ADMIN — INSULIN LISPRO 6 UNITS: 100 INJECTION, SOLUTION INTRAVENOUS; SUBCUTANEOUS at 06:29

## 2022-01-28 RX ADMIN — INSULIN GLARGINE 18 UNITS: 100 INJECTION, SOLUTION SUBCUTANEOUS at 08:39

## 2022-01-28 RX ADMIN — INSULIN LISPRO 6 UNITS: 100 INJECTION, SOLUTION INTRAVENOUS; SUBCUTANEOUS at 11:35

## 2022-01-28 ASSESSMENT — PAIN SCALES - GENERAL: PAINLEVEL_OUTOF10: 0

## 2022-01-28 NOTE — CARE COORDINATION
Social Work discharge planning   Sw received call from pt's wife asking about financial assist with pt's medications. She advised pt's medications cost approx $1,000 at [facility]. Wife said she called Endocrinology office, but it is closed. Wife said their income is $1,300 every other week for pt and that she, his wife, makes $18/hour. Haroldo called Mary with [employer] to clarify pt's eligibility for HCAP help with meds. She advised she spoke to pt, and he did not qualify, and the income wife is reporting now is still over in income limit for HCAP help with meds.    Electronically signed by Manas Jenkins on 1/28/2022 at 3:03 PM

## 2022-01-28 NOTE — PROGRESS NOTES
Pulmonary/Critical Care to sign off at this time. Please let us know if our services are needed any further.     Brandy Levy MD

## 2022-01-28 NOTE — PROGRESS NOTES
Spoke with Sharona Herzog at diabetes education -aware of consult  Electronically signed by Aslhey Louie RN on 1/28/2022 at 9:31 AM

## 2022-01-28 NOTE — PROGRESS NOTES
Internal Medicine Progress Note    Patient's name: Alexys Neumann  : 1975  Chief complaints (on day of admission): Dizziness (for a couple weeks, now feels like he is giong to pass out, weight loss ), Blurred Vision, Palpitations, and Shortness of Breath  Admission date: 2022  Date of service: 2022   Room: [address]  Primary care physician: Jordan Urrutia MD  Reason for visit: Follow-up for DKA     Subjective  Clayton was seen and examined at bedside     Doing well today   All questions answered   Diabetes educator in room   Discussed need for very close op fu   Discussed leaving hospital on statin and ace-I     Current treatment plan discussed and all questions answered    Current medications being prescribed discussed and patient expresses verbal understanding     Review of Systems  There are no new complaints of chest pain, shortness of breath, abdominal pain, nausea, vomiting, diarrhea, constipation unless otherwise mentioned above.      Hospital Medications  Current Facility-Administered Medications   Medication Dose Route Frequency Provider Last Rate Last Admin    glucose (GLUTOSE) 40 % oral gel 15 g  15 g Oral PRN Jeff Figueroa MD        dextrose 50 % IV solution  12.5 g IntraVENous PRN Jeff Figueroa MD        glucagon (rDNA) injection 1 mg  1 mg IntraMUSCular PRN Jeff Figueroa MD        dextrose 5 % solution  100 mL/hr IntraVENous PRN Jeff Figueroa MD        insulin glargine (LANTUS) injection vial 18 Units  18 Units SubCUTAneous Daily Jeff Figueroa MD   18 Units at 22 0900    insulin lispro (HUMALOG) injection vial 0-18 Units  0-18 Units SubCUTAneous TID WC Jeff Figueroa MD   6 Units at 22 0629    insulin lispro (HUMALOG) injection vial 0-9 Units  0-9 Units SubCUTAneous Nightly Jeff Figueroa MD   2 Units at 22 1501    sodium chloride flush 0.9 % injection 10 mL  10 mL IntraVENous 2 times per day Jeff Figueroa MD   10 mL at 01/27/22 2150    sodium chloride flush 0.9 % injection 10 mL  10 mL IntraVENous PRN Brianne Zamora MD        0.9 % sodium chloride infusion  25 mL IntraVENous PRN Brianne Zamora MD        potassium chloride (KLOR-CON M) extended release tablet 40 mEq  40 mEq Oral PRN Brianne Zamora MD   40 mEq at 01/26/22 2356    Or    potassium bicarb-citric acid (EFFER-K) effervescent tablet 40 mEq  40 mEq Oral PRN Brianne Zamora MD        Or    potassium chloride 10 mEq/100 mL IVPB (Peripheral Line)  10 mEq IntraVENous PRN Brianne Zamora MD        enoxaparin (LOVENOX) injection 40 mg  40 mg SubCUTAneous Daily Brianne Zamora MD   40 mg at 01/27/22 2143    ondansetron (ZOFRAN-ODT) disintegrating tablet 4 mg  4 mg Oral Q8H PRN Brianne Zamora MD        Or    [facility]ron [facility] injection 4 mg  4 mg IntraVENous Q6H PRN Brianne Zamora MD        senna [facility]) tablet 8.6 mg  1 tablet Oral Daily PRN Brianne Zamora MD        acetaminophen (TYLENOL) tablet 650 mg  650 mg Oral Q6H PRN Brianne Zamora MD   650 mg at 01/27/22 3116    Or    acetaminophen (TYLENOL) suppository 650 mg  650 mg Rectal Q6H PRN Brianne Zamora MD           PRN Medications  glucose, dextrose, glucagon (rDNA), dextrose, sodium chloride flush, sodium chloride, potassium chloride **OR** potassium alternative oral replacement **OR** potassium chloride, ondansetron **OR** ondansetron, senna, acetaminophen **OR** acetaminophen    Objective  Most Recent Recorded Vitals  /80   Pulse 79   Temp 98.4 °F (36.9 °C) (Oral)   Resp 15   Ht 6' (1.829 m)   Wt 177 lb (80.3 kg)   SpO2 99%   BMI 24.01 kg/m²   I/O last 3 completed shifts: In: 8325 [I.V.:1736]  Out: 1000 [Urine:1000]  No intake/output data recorded.     Physical Exam:  General: AAO to person/place/time/purpose, NAD, no labored breathing  Eyes: conjunctivae/corneas clear, sclera non icteric  Skin: color/texture/turgor normal, no rashes or lesions  Lungs: CTAB, no retractions/use of accessory muscles, no vocal fremitus, no rhonchi, no crackle, no rales  Heart: regular rate, regular rhythm, no murmur  Abdomen: soft, NT, bowel sounds normal  Extremities: atraumatic, no edema  Neurologic: cranial nerves 2-12 grossly intact, no slurred speech    Most Recent Labs  Lab Results   Component Value Date    WBC 4.4 (L) 01/28/2022    HGB 12.6 01/28/2022    HCT 37.6 01/28/2022     01/28/2022     01/28/2022    K 3.6 01/28/2022     01/28/2022    CREATININE 0.9 01/28/2022    BUN 8 01/28/2022    CO2 21 (L) 01/28/2022    GLUCOSE 156 (H) 01/28/2022    ALT 11 01/28/2022    AST 14 01/28/2022    LABA1C 11.5 (H) 01/26/2022       XR CHEST (2 VW)   Final Result   No pneumonia or pleural effusion. Echocardiogram       Assessment   Active Hospital Problems    Diagnosis     Diabetes mellitus, new onset ([facility] [address] [E11.9]      Priority: Medium    Dehydration [E86.0]      Priority: Medium    High anion gap metabolic acidosis [N03.2]      Priority: Medium    DKA (diabetic ketoacidosis) ([address] [E11.10]          Plan  · Resolved DKA/elevated anion gap metabolic acidosis/severe dehydration w/ newly diagnosed DM -- likely LODY:   ? Out of ICU 1/27  ? Endocrinology following and managing insulins. ? HbA1c 11.5%. ? Oral diet started  ? Consult diabetic educator and dietitian. ? Nursing to teach the patient how to give himself insulin  ? IVF hydration. ? Follow BMP/mag/phos/lactic acid. ? Replace electrolytes as needed  ? He needs to see his PCP in the next 2 weeks  ? He needs to set up an ophthalmology evaluation as an outpatient  ? He needs to follow endocrine as an op closely   ? Start Statin and ace-i  ·   · PT AM-PAC--   · DVT prophylaxis   · Code status Full   · Medications, labs and imaging reviewed   · Discharge plan: Home today if ok with endo    Electronically signed by Vinod Covington MD on 1/28/2022 at 7:59 AM    I can be reached through [address] [place] [address].

## 2022-01-28 NOTE — PLAN OF CARE
Problem: Serum Glucose Level - Abnormal:  Goal: Ability to maintain appropriate glucose levels will improve to within specified parameters  Description: Ability to maintain appropriate glucose levels will improve to within specified parameters  Outcome: Ongoing     Problem: Sleep Pattern Disturbance:  Goal: Appears well-rested  Description: Appears well-rested  Outcome: Ongoing

## 2022-01-28 NOTE — PROGRESS NOTES
ENDOCRINOLOGY PROGRESS NOTE      Date of admission: 1/26/2022  Date of service: 1/28/2022  Admitting physician: Yesica Bray MD   Primary Care Physician: Lori Andrew MD  Consultant physician: Ernie Madera MD     Reason for the consultation:  Uncontrolled DM    History of Present Illness: The history is provided by the patient. Accuracy of the patient data is excellent    Timothy Mcdonough is a very pleasant 40 y.o. old male with no significant PMH listed below admitted to [address] [place] [address] on 1/26/2022 because of nausea and weakness and found to be in DKA, endocrine service was consulted for diabetes management. Subjective   Seen and examined, feels better,  For discharge today     Inpatient diet:   Carb control diet     Point of care glucose monitoring   (Independently reviewed)   Recent Labs     01/27/22  0611 01/27/22  0811 01/27/22  0959 01/27/22  1011 01/27/22  1227 01/27/22  1751 01/27/22  2146 01/28/22  0628   GLUMET 221* 223* 356* 307* 238* 335* 188* 206*     Scheduled Meds:   insulin glargine  18 Units SubCUTAneous Daily    insulin lispro  0-18 Units SubCUTAneous TID WC    insulin lispro  0-9 Units SubCUTAneous Nightly    sodium chloride flush  10 mL IntraVENous 2 times per day    enoxaparin  40 mg SubCUTAneous Daily       PRN Meds:   glucose, 15 g, PRN  dextrose, 12.5 g, PRN  glucagon (rDNA), 1 mg, PRN  dextrose, 100 mL/hr, PRN  sodium chloride flush, 10 mL, PRN  sodium chloride, 25 mL, PRN  potassium chloride, 40 mEq, PRN   Or  potassium alternative oral replacement, 40 mEq, PRN   Or  potassium chloride, 10 mEq, PRN  ondansetron, 4 mg, Q8H PRN   Or  ondansetron, 4 mg, Q6H PRN  senna, 1 tablet, Daily PRN  acetaminophen, 650 mg, Q6H PRN   Or  acetaminophen, 650 mg, Q6H PRN      Continuous Infusions:   dextrose      sodium chloride         Review of Systems  All systems reviewed.  All negative except for symptoms mentioned in HPI     OBJECTIVE    /80   Pulse 79   Temp 98.4 °F (36.9 °C) (Oral) Resp 15   Ht 6' (1.829 m)   Wt 177 lb (80.3 kg)   SpO2 99%   BMI 24.01 kg/m²   No intake or output data in the 24 hours ending 01/28/22 1007    Physical examination:  General: awake alert, oriented x3  HEENT: normocephalic non traumatic, no exophthalmos   Neck: supple, No thyroid tenderness,  Pulm: good equal air entry no added sounds  CVS: S1 + S2  Abd: soft lax, no tenderness  Skin: warm, no lesions, no rash. No open wounds, no ulcers   Neuro: CN intact, sensation decreased bilateral , muscle power normal  Psych: normal mood, and affect    Review of Laboratory Data:  I personally reviewed the following labs:   Recent Labs     01/26/22  1037 01/27/22  0803 01/28/22 0312   WBC 8.8 5.6 4.4*   RBC 6.23* 4.59 4.50   HGB 17.5* 13.0 12.6   HCT 53.7 38.6 37.6   MCV 86.2 84.1 83.6   MCH 28.1 28.3 28.0   MCHC 32.6 33.7 33.5   RDW 12.6 12.8 12.6    222 203   MPV 10.5 10.2 10.1     Recent Labs     01/26/22  1332 01/26/22  1508 01/27/22  0210 01/27/22  0803 01/28/22 0312      < > 138 138 139   K 5.3*   < > 4.6 3.8 3.6   CL 96*   < > 109* 108* 107   CO2 10*   < > 18* 20* 21*   BUN 15   < > 9 8 8   CREATININE 1.1   < > 1.0 1.0 0.9   GLUCOSE 477*   < > 166* 214* 156*   CALCIUM 9.2   < > 8.4* 8.4* 8.8   PROT 8.5*  --   --  6.5 6.0*   LABALBU 4.9  --   --  4.0 3.5   BILITOT 0.3  --   --  0.4 0.3   ALKPHOS 108  --   --  74 70   AST 14  --   --  13 14   ALT 13  --   --  10 11    < > = values in this interval not displayed.      Beta-Hydroxybutyrate   Date Value Ref Range Status   01/26/2022 >4.50 (H) 0.02 - 0.27 mmol/L Final     Lab Results   Component Value Date    LABA1C 11.5 01/26/2022     No results found for: TSH, T4FREE, G8NWTMX, FT3, E9OEWXI, TSI, TPOABS, THGAB  Lab Results   Component Value Date    LABA1C 11.5 01/26/2022    GLUCOSE 156 01/28/2022     No results found for: TRIG, HDL, LDLCALC, CHOL    Blood culture   Lab Results   Component Value Date    BC 24 Hours no growth 01/26/2022       Radiology:  XR CHEST (2 VW)   Final Result   No pneumonia or pleural effusion. Medical Records/Labs/Images review:   I personally reviewed and summarized previous records   All labs and imaging were reviewed independently     Leonel Molina, a 40 y.o.-old male seen today for inpatient diabetes management     Newly diagnosed Diabetes Mellitus  · Admitted with DKA   · We recommend the following DM regimen  · Lantus 18u daily  · Humalog 6u with meals   · Medium dose sliding scale   · Will help sq transition once DKA resolve   · Continue glucose check with meals and at bedtime   · Pt will follow with us after discharge. Endocrine follow up visit, Monday 1/31 at 2:30pm     Hyperkalemia   · Improved     The above issues were reviewed with the patient who understood and agreed with the plan. Thank you for allowing us to participate in the care of this patient. Please do not hesitate to contact us with any additional questions. Ernie Madera MD  Endocrinologist, [facility]   [address] [place] [id number]   Phone: [phone]  Fax: [phone]  --------------------------------------------  An electronic signature was used to authenticate this note.  Marie Stark MD on 1/28/2022 at 10:07 AM

## 2022-01-28 NOTE — DISCHARGE SUMMARY
Internal Medicine Discharge Summary    NAME: Courtney Ness :  1975  MRN:  [id number] Annalise Bonilla MD    ADMITTED: 2022   DISCHARGED: No discharge date for patient encounter. ADMITTING PHYSICIAN: Elena Kelsey MD    PCP: Sang Briones MD    CONSULTANT(S):   IP CONSULT TO INTERNAL MEDICINE  IP CONSULT TO CRITICAL CARE  IP CONSULT TO CASE MANAGEMENT  IP CONSULT TO SOCIAL WORK  IP CONSULT TO ENDOCRINOLOGY  IP CONSULT TO DIABETES EDUCATOR  IP CONSULT TO DIETITIAN  IP CONSULT TO DIABETES EDUCATOR     ADMITTING DIAGNOSIS:   Secondary DM with DKA, uncontrolled ([address] [E13.10]     Please see H&P for further details    DISCHARGE DIAGNOSES:   Active Hospital Problems    Diagnosis     Diabetes mellitus, new onset ([address] [E11.9]      Priority: Medium    Dehydration [E86.0]      Priority: Medium    High anion gap metabolic acidosis [B97.4]      Priority: Medium    DKA (diabetic ketoacidosis) ([address] [E11.10]        BRIEF HISTORY OF PRESENT ILLNESS: Courtney Ness is a 40 y.o. male patient of Sang Briones MD who  has a past medical history of Pain in left knee. who originally had concerns including Dizziness (for a couple weeks, now feels like he is giong to pass out, weight loss ), Blurred Vision, Palpitations, and Shortness of Breath. at presentation on 2022, and was found to have Secondary DM with DKA, uncontrolled ([address] [E13.10] after workup. Please see H&P for further details. HOSPITAL COURSE:   The patient presented to the hospital with the chief complaint of Dizziness (for a couple weeks, now feels like he is giong to pass out, weight loss ), Blurred Vision, Palpitations, and Shortness of Breath  . The patient was admitted to the hospital.     · Resolved DKA/elevated anion gap metabolic acidosis/severe dehydration w/ newly diagnosed DM -- likely LODY:   ? Out of ICU   ? Endocrinology following and managing insulins. ? HbA1c 11.5%. ? Oral diet started  ?  Consult diabetic educator and dietitian. ? Nursing to teach the patient how to give himself insulin  ? IVF hydration. ? Follow BMP/mag/phos/lactic acid. ? Replace electrolytes as needed  ? He needs to see his PCP in the next 2 weeks  ? He needs to set up an ophthalmology evaluation as an outpatient  ? He needs to follow endocrine as an op closely   ? Start Statin and ace-i  ·   · PT AM-PAC--   · DVT prophylaxis   · Code status Full   · Medications, labs and imaging reviewed   Discharge plan: Home today with close op fu       BRIEF PHYSICAL EXAMINATION AND LABORATORIES ON DAY OF DISCHARGE:  VITALS:  /80   Pulse 79   Temp 98.4 °F (36.9 °C) (Oral)   Resp 15   Ht 6' (1.829 m)   Wt 177 lb (80.3 kg)   SpO2 99%   BMI 24.01 kg/m²       Please see note from the same day. LABS[de-identified]  Recent Labs     01/27/22  0210 01/27/22  0803 01/28/22  0312    138 139   K 4.6 3.8 3.6   * 108* 107   CO2 18* 20* 21*   BUN 9 8 8   CREATININE 1.0 1.0 0.9   GLUCOSE 166* 214* 156*   CALCIUM 8.4* 8.4* 8.8     Recent Labs     01/26/22  1332 01/27/22  0803 01/28/22  0312   ALKPHOS 108 74 70   ALT 13 10 11   AST 14 13 14   PROT 8.5* 6.5 6.0*   BILITOT 0.3 0.4 0.3   LABALBU 4.9 4.0 3.5     Recent Labs     01/26/22  1037 01/27/22  0803 01/28/22  0312   WBC 8.8 5.6 4.4*   RBC 6.23* 4.59 4.50   HGB 17.5* 13.0 12.6   HCT 53.7 38.6 37.6   MCV 86.2 84.1 83.6   MCH 28.1 28.3 28.0   MCHC 32.6 33.7 33.5   RDW 12.6 12.8 12.6    222 203   MPV 10.5 10.2 10.1     Lab Results   Component Value Date    LABA1C 11.5 (H) 01/26/2022     No results found for: INR, PROTIME   No results found for: TSH  No results found for: TRIG, HDL, LDLCALC  Recent Labs     01/26/22  1729 01/27/22  0803 01/28/22  0312   MG 2.5 2.4 1.8       No results for input(s): CKTOTAL, CKMB, TROPONINI in the last 72 hours. No results for input(s): LACTA in the last 72 hours.     IMAGING:  XR CHEST (2 VW)    Result Date: 1/26/2022  EXAMINATION: TWO XRAY VIEWS OF THE CHEST 1/26/2022 8:26 am COMPARISON: June 18, 2018 HISTORY: ORDERING SYSTEM PROVIDED HISTORY: shortness of breath TECHNOLOGIST PROVIDED HISTORY: Reason for exam:->shortness of breath FINDINGS: No airspace opacity or pleural effusion. The heart is normal size. No pneumothorax. No free air beneath the hemidiaphragms. No pneumonia or pleural effusion. MICROBIOLOGY:  BLOOD CX #1  Recent Labs     01/26/22  1720   BC 24 Hours no growth     BLOOD CX #2  Recent Labs     01/26/22  1729   BLOODCULT2 24 Hours no growth     TIP CULTURE  No results for input(s): CXCATHTIP in the last 72 hours. CULTURE, RESPIRATORY   No results for input(s): CULTRESP in the last 72 hours. RESPIRATORY SMEAR  No results for input(s): RESPSMEAR in the last 72 hours. ECHO:      DISPOSITION:  The patient's condition is good. The patient is being discharged to home    DISCHARGE MEDICATIONS:      Medication List      START taking these medications    atorvastatin 10 MG tablet  Commonly known as: LIPITOR  Take 1 tablet by mouth daily     BD Pen Needle Micro U/F 32G X 6 MM Misc  Generic drug: Insulin Pen Needle  Uses with insulin 4 times a day     Blood Glucose Monitoring Suppl Misc  OneTouch ultra Glucometer     blood glucose test strips  One-Touch Ultra strips. Check 4 times/day before meals and at bedtime and as needed for symptoms of irregular blood glucose     * insulin lispro (1 Unit Dial) 100 UNIT/ML Sopn  Commonly known as: HumaLOG KwikPen  Inject 6 units with meals + following sliding scale. -200 add 2U, -250 add 4U, -300 add 6U, -350 add 8U, -400 add 12U, BS over 400 add 12U.  MAX 30U/day     * insulin lispro 100 UNIT/ML injection vial  Commonly known as: HUMALOG  Inject 0-9 Units into the skin nightly     Lantus SoloStar 100 UNIT/ML injection pen  Generic drug: insulin glargine  Inject 20 Units into the skin every morning     lisinopril 5 MG tablet  Commonly known as: PRINIVIL;ZESTRIL  Take 1 tablet by mouth daily     ONE TOUCH ULTRASOFT LANCETS Misc  Test 4 times/day before meals and at bedtime and as needed for symptoms of irregular blood glucose. * This list has 2 medication(s) that are the same as other medications prescribed for you. Read the directions carefully, and ask your doctor or other care provider to review them with you. CONTINUE taking these medications    sildenafil 20 MG tablet  Commonly known as: REVATIO  Take 2 tablets by mouth daily as needed (erectile dysfunction)     therapeutic multivitamin-minerals tablet           Where to Get Your Medications      These medications were sent to [address], [address] [facility] [phone]  [address] [address]    Phone: [phone]   · atorvastatin 10 MG tablet  · BD Pen Needle Micro U/F 32G X 6 MM Misc  · Blood Glucose Monitoring Suppl Misc  · blood glucose test strips  · insulin lispro (1 Unit Dial) 100 UNIT/ML Sopn  · insulin lispro 100 UNIT/ML injection vial  · Lantus SoloStar 100 UNIT/ML injection pen  · lisinopril 5 MG tablet  · ONE TOUCH ULTRASOFT LANCETS Misc         Current Discharge Medication List        Current Discharge Medication List        Current Discharge Medication List      START taking these medications    Details   insulin lispro (HUMALOG) 100 UNIT/ML injection vial Inject 0-9 Units into the skin nightly  Qty: 10 mL, Refills: 3      atorvastatin (LIPITOR) 10 MG tablet Take 1 tablet by mouth daily  Qty: 90 tablet, Refills: 1      lisinopril (PRINIVIL;ZESTRIL) 5 MG tablet Take 1 tablet by mouth daily  Qty: 30 tablet, Refills: 0      insulin glargine (LANTUS SOLOSTAR) 100 UNIT/ML injection pen Inject 20 Units into the skin every morning  Qty: 5 pen, Refills: 3      insulin lispro, 1 Unit Dial, (HUMALOG KWIKPEN) 100 UNIT/ML SOPN Inject 6 units with meals + following sliding scale.  -200 add 2U, -250 add 4U, -300 add 6U, -350 add 8U, -400 add 12U, BS over 400 add 12U. MAX 30U/day  Qty: 5 pen, Refills: 3      Insulin Pen Needle (BD PEN NEEDLE MICRO U/F) 32G X 6 MM MISC Uses with insulin 4 times a day  Qty: 250 each, Refills: 5      ONE TOUCH ULTRASOFT LANCETS MISC Test 4 times/day before meals and at bedtime and as needed for symptoms of irregular blood glucose. Qty: 250 each, Refills: 5      Blood Glucose Monitoring Suppl MISC OneTouch ultra Glucometer  Qty: 1 each, Refills: 0      blood glucose monitor strips One-Touch Ultra strips. Check 4 times/day before meals and at bedtime and as needed for symptoms of irregular blood glucose  Qty: 250 strip, Refills: 5             INTERNAL MEDICINE FOLLOW UP/INSTRUCTIONS:  · Follow-up with primary care physician within 1 week of discharge from hospital  · Please review changes to pre-hospital admission medications and prescriptions for new medications upon discharge from the hospital with PCP  · Please review results of labs and imaging studies with PCP  · Follow-up with consultants as directed by them   · If recurrence or worsening of symptoms please call primary care physician or return to the ER immediately  · Diet: ADULT DIET; Regular; 4 carb choices (60 gm/meal)    Preparing for this patient's discharge, including paperwork, orders, instructions, and meeting with patient did required >35 minutes.     Electronically signed by Jacob Ferguson MD on 1/28/2022 at 12:55 PM

## 2022-01-28 NOTE — PROGRESS NOTES
Reason for consult: new diagnosis    A1C: 11.5%     [] Not available                 Patient states the following concerns/barriers to diabetes self-management:       [x] None       [] Medication cost   [] Food cost/availability          [] Reading  [] Hearing   [] Vision                  [] Work    [] Transportation  [] No insurance    [] Physical limitations    [] Other:              Diabetes survival packet provided to:   [x] Patient     [] Other:    Information reviewed:   Definition of diabetes   Target glucose ranges/A1C   Self-monitoring of blood glucose   Prevention/symptoms/treatment of hypo-/hyperglycemia   Medication adherence   The plate method/meal planning guidelines   The benefits of exercise and recommendations   Reducing the risk of chronic complications          Comment: Patient pleasant and receptive to education. Patients wife present for education. Glucometer and test strips given to patient from diabetes education department. Instructed patient on use of glucometer, patient able to demonstrate without difficulty. Practiced dosing and injecting insulin with demo supplies. Patient able to demonstrate without difficulty. Patient prefers insulin pens over vial and syringe, feels more comfortable with the insulin pen method. Types, actions, and use of Humalog and Lantus described in detail. Importance of monitoring reviewed. Importance of meal spacing and composition of meals discussed. Patient and wife are interested in outpatient diabetes education. Patient will call with any questions.      Diabetes medications reviewed (use, purpose, action, side effects): Lantus, Humalog     Evaluation/Plan/Recommendations:   Patient's understanding of diabetes:   [x]Poor   []Fair    []Good   [] Excellent     Outpatient diabetes education is recommended:   [x] Yes  [] No   [] As needed  Patient is interested in outpatient diabetes education:   [x] Yes    [] No    [] Unsure    Recommended:     [x] Consult to social work; patient has no insurance or financial hardship      [x] Script for glucometer and supplies (per preference of patient's insurance)               [x] Script for outpatient diabetes education classes from PCP    [x] Carbohydrate-controlled diet    [x] Patient prefers/educator recommends insulin pens instead of syringes     (if insulin ordered for home use)    Thank you for this consult.       Mariah Dukes MS, RDN, LD

## 2022-01-28 NOTE — PROGRESS NOTES
Nutrition Education    · Verbally reviewed information with Patient and Family  · Educated on Diabetic Diet and CHO counting instructions. Patient/family had great questions and were very interested in the information. · Written educational materials provided. · Contact name and number provided.     Electronically signed by Jimmy Spear MS, RD, LD on 1/28/22 at 1:30 PM EST    Contact: 2012

## 2022-01-31 ENCOUNTER — OFFICE VISIT (OUTPATIENT)
Dept: ENDOCRINOLOGY | Age: 47
End: 2022-01-31

## 2022-01-31 VITALS
OXYGEN SATURATION: 100 % | HEART RATE: 78 BPM | DIASTOLIC BLOOD PRESSURE: 77 MMHG | WEIGHT: 184 LBS | HEIGHT: 72 IN | BODY MASS INDEX: 24.92 KG/M2 | SYSTOLIC BLOOD PRESSURE: 152 MMHG

## 2022-01-31 DIAGNOSIS — E78.5 HYPERLIPIDEMIA, UNSPECIFIED HYPERLIPIDEMIA TYPE: ICD-10-CM

## 2022-01-31 DIAGNOSIS — E11.9 TYPE 2 DIABETES MELLITUS WITHOUT COMPLICATION, WITH LONG-TERM CURRENT USE OF INSULIN (HCC): Primary | ICD-10-CM

## 2022-01-31 DIAGNOSIS — Z91.119 DIETARY NONCOMPLIANCE: ICD-10-CM

## 2022-01-31 DIAGNOSIS — Z79.4 TYPE 2 DIABETES MELLITUS WITHOUT COMPLICATION, WITH LONG-TERM CURRENT USE OF INSULIN (HCC): Primary | ICD-10-CM

## 2022-01-31 DIAGNOSIS — E55.9 VITAMIN D DEFICIENCY: ICD-10-CM

## 2022-01-31 LAB
BLOOD CULTURE, ROUTINE: NORMAL
CULTURE, BLOOD 2: NORMAL

## 2022-01-31 PROCEDURE — 1111F DSCHRG MED/CURRENT MED MERGE: CPT | Performed by: NURSE PRACTITIONER

## 2022-01-31 PROCEDURE — 99214 OFFICE O/P EST MOD 30 MIN: CPT | Performed by: NURSE PRACTITIONER

## 2022-01-31 NOTE — PROGRESS NOTES
[address] [place] [address] Department of Endocrinology Diabetes and Metabolism   [address] [facility] [address]   Phone: [phone]  Fax: [phone]    Date of Service: 2/1/2022    Primary Care Physician: Simran Holloway MD  Referring physician: No ref. provider found  Provider: ANNABEL Al NP     Reason for the visit:  DM Type 2, hospital f/u    History of Present Illness: The history is provided by the patient. No  was used. Accuracy of the patient data is excellent. He is accompanied today by his wife. Ishaan Silverman is a very pleasant 40 y.o. male seen today for diabetes management. He was recently admitted to [facility] on 1/26/2022 because of nausea and weakness and found to be in DKA, A1c 11.5%. No hx of DM. Ishaan Silverman was diagnosed with diabetes at age 40 during recent hospitalization and currently on Lantus 20 u daily, Humalog 6u with meals  plus Medium dose sliding scale     The patient has been checking blood sugar before meals. Most recent A1c results summarized below  Lab Results   Component Value Date    LABA1C 11.5 01/26/2022     Patient reported no  hypoglycemic episodes  The patient has been mindful of what has been eating and following diabetic diet as encouraged - Keeping a very well detailed log, which he brought for review  I reviewed current medications and the patient has no issues with diabetes medications  Clayton Lucia The patient is due for an eye exam. No h/o diabetic retinopathy  The patient performs own feet care.   Microvascular complications:  No Retinopathy, Nephropathy or Neuropathy   Macrovascular complications: no CAD, PVD, or Stroke  The patient does not receive Flushot     PAST MEDICAL HISTORY   Past Medical History:   Diagnosis Date    Pain in left knee 05/2019       PAST SURGICAL HISTORY   Past Surgical History:   Procedure Laterality Date    FOOT SURGERY Left as youth    PATELLAR TENDON REPAIR Left 6/3/2019    LEFT LEG PATELLA TENDON REPAIR performed by Britni Tobias MD at [address]   Tobacco:   reports that he has never smoked. He has never used smokeless tobacco.  Alcohol:   reports current alcohol use. Drugs:   reports current drug use. Drug: Marijuana [place]). FAMILY HISTORY   Family History   Problem Relation Age of Onset    Diabetes Mother     Hypertension Father        ALLERGIES AND DRUG REACTIONS   No Known Allergies    CURRENT MEDICATIONS   Current Outpatient Medications   Medication Sig Dispense Refill    insulin glargine (LANTUS SOLOSTAR) 100 UNIT/ML injection pen Inject 20 Units into the skin every morning (Patient taking differently: Inject 20 Units into the skin at bedtime ) 5 pen 3    insulin lispro, 1 Unit Dial, (HUMALOG KWIKPEN) 100 UNIT/ML SOPN Inject 6 units with meals + following sliding scale. -200 add 2U, -250 add 4U, -300 add 6U, -350 add 8U, -400 add 12U, BS over 400 add 12U. MAX 30U/day 5 pen 3    atorvastatin (LIPITOR) 10 MG tablet Take 1 tablet by mouth daily 90 tablet 1    lisinopril (PRINIVIL;ZESTRIL) 5 MG tablet Take 1 tablet by mouth daily 30 tablet 0    Insulin Pen Needle (BD PEN NEEDLE MICRO U/F) 32G X 6 MM MISC Uses with insulin 4 times a day 250 each 5    ONE TOUCH ULTRASOFT LANCETS MISC Test 4 times/day before meals and at bedtime and as needed for symptoms of irregular blood glucose. 250 each 5    Blood Glucose Monitoring Suppl MISC OneTouch ultra Glucometer 1 each 0    blood glucose monitor strips One-Touch Ultra strips.  Check 4 times/day before meals and at bedtime and as needed for symptoms of irregular blood glucose 250 strip 5    sildenafil (REVATIO) 20 MG tablet Take 2 tablets by mouth daily as needed (erectile dysfunction) 60 tablet 5    Multiple Vitamins-Minerals (THERAPEUTIC MULTIVITAMIN-MINERALS) tablet Take 1 tablet by mouth daily Ld 05/29/2019       No current facility-administered medications 01/28/2022 03:12 AM    HGB 12.6 01/28/2022 03:12 AM    HCT 37.6 01/28/2022 03:12 AM    MCV 83.6 01/28/2022 03:12 AM    MCH 28.0 01/28/2022 03:12 AM    MCHC 33.5 01/28/2022 03:12 AM    RDW 12.6 01/28/2022 03:12 AM     01/28/2022 03:12 AM    MPV 10.1 01/28/2022 03:12 AM      Lab Results   Component Value Date/Time     01/28/2022 03:12 AM    K 3.6 01/28/2022 03:12 AM    K 3.8 01/27/2022 08:03 AM    CO2 21 (L) 01/28/2022 03:12 AM    BUN 8 01/28/2022 03:12 AM    CREATININE 0.9 01/28/2022 03:12 AM    CALCIUM 8.8 01/28/2022 03:12 AM    LABGLOM >60 01/28/2022 03:12 AM    GFRAA >60 01/28/2022 03:12 AM      No results found for: TSH, T4FREE, S4IQUJA, FT3, G4UFZSM, TSI, TPOABS, THGAB  Lab Results   Component Value Date    LABA1C 11.5 01/26/2022    GLUCOSE 156 01/28/2022     Lab Results   Component Value Date    LABA1C 11.5 01/26/2022     No results found for: TRIG, HDL, LDLCALC, CHOL  No results found for: VITD25    ASSESSMENT & RECOMMENDATIONS   Ishaan Silverman, a 40 y.o.-old male seen in for the following issues       Assessment:      Diagnosis Orders   1. Type 2 diabetes mellitus without complication, with long-term current use of insulin (HCC)  Glutamic Acid Decarboxylase    C-PEPTIDE    Vitamin D 25 Hydroxy   2. Vitamin D deficiency     3. Hyperlipidemia, unspecified hyperlipidemia type  LIPID PANEL   4. Dietary noncompliance         Plan:     1. Type 2 diabetes mellitus without complication, with long-term current use of insulin ([address]   · Patient's diabetes is uncontrolled. A1c 11.5%  · BS log reviewed and and BS improving. · Will make no changes DM regimen to  Lantus 20 u daily, Humalog 6u with meals  Plus Medium dose sliding scale   · The patient was advised to check blood sugars 4 times a day before meals and at bedtime and send BS readings to our office in 2 weeks.   Pt to call if BS <70, 3 consecutive recordings  · Will check ANGELIQUE and C-peptide to r/o DM Type 1  · Discussed with patient A1c and blood glucose test strips  One-Touch Ultra strips. Check 4 times/day before meals and at bedtime and as needed for symptoms of irregular blood glucose     lisinopril 5 MG tablet  Commonly known as: PRINIVIL;ZESTRIL  Take 1 tablet by mouth daily     ONE TOUCH ULTRASOFT LANCETS Misc  Test 4 times/day before meals and at bedtime and as needed for symptoms of irregular blood glucose. sildenafil 20 MG tablet  Commonly known as: REVATIO  Take 2 tablets by mouth daily as needed (erectile dysfunction)     therapeutic multivitamin-minerals tablet              Medications marked \"taking\" at this time  Outpatient Medications Marked as Taking for the 1/31/22 encounter (Office Visit) with ANNABEL Castellanos NP   Medication Sig Dispense Refill    insulin glargine (LANTUS SOLOSTAR) 100 UNIT/ML injection pen Inject 20 Units into the skin every morning (Patient taking differently: Inject 20 Units into the skin at bedtime ) 5 pen 3    insulin lispro, 1 Unit Dial, (HUMALOG KWIKPEN) 100 UNIT/ML SOPN Inject 6 units with meals + following sliding scale. -200 add 2U, -250 add 4U, -300 add 6U, -350 add 8U, -400 add 12U, BS over 400 add 12U. MAX 30U/day 5 pen 3        Medications patient taking as of now reconciled against medications ordered at time of hospital discharge: No    Chief Complaint   Patient presents with    Diabetes     last A1C 11.5% on 1/26/22    Follow-Up from Hospital         Vitals:    01/31/22 1534   BP: (!) 152/77   Pulse: 78   SpO2: 100%   Weight: 184 lb (83.5 kg)   Height: 6' (1.829 m)     Body mass index is 24.95 kg/m².    Wt Readings from Last 3 Encounters:   01/31/22 184 lb (83.5 kg)   01/28/22 177 lb (80.3 kg)   03/06/20 200 lb (90.7 kg)     BP Readings from Last 3 Encounters:   01/31/22 (!) 152/77   01/28/22 138/80   03/06/20 (!) 142/90

## 2022-02-17 DIAGNOSIS — E78.5 HYPERLIPIDEMIA, UNSPECIFIED HYPERLIPIDEMIA TYPE: ICD-10-CM

## 2022-02-17 DIAGNOSIS — E11.9 TYPE 2 DIABETES MELLITUS WITHOUT COMPLICATION, WITH LONG-TERM CURRENT USE OF INSULIN (HCC): ICD-10-CM

## 2022-02-17 DIAGNOSIS — Z79.4 TYPE 2 DIABETES MELLITUS WITHOUT COMPLICATION, WITH LONG-TERM CURRENT USE OF INSULIN (HCC): ICD-10-CM

## 2022-02-17 LAB
CHOLESTEROL, TOTAL: 147 MG/DL (ref 0–199)
HDLC SERPL-MCNC: 55 MG/DL
LDL CHOLESTEROL CALCULATED: 82 MG/DL (ref 0–99)
TRIGL SERPL-MCNC: 51 MG/DL (ref 0–149)
VITAMIN D 25-HYDROXY: 33 NG/ML (ref 30–100)
VLDLC SERPL CALC-MCNC: 10 MG/DL

## 2022-02-21 LAB — C-PEPTIDE: 0.5 NG/ML (ref 0.5–3.3)

## 2022-02-22 ENCOUNTER — TELEPHONE (OUTPATIENT)
Dept: ENDOCRINOLOGY | Age: 47
End: 2022-02-22

## 2022-02-22 LAB — GLUTAMIC ACID DECARB AB: >250 IU/ML (ref 0–5)

## 2022-02-26 PROBLEM — E86.0 DEHYDRATION: Status: RESOLVED | Noted: 2022-01-27 | Resolved: 2022-02-26

## 2022-02-28 ENCOUNTER — OFFICE VISIT (OUTPATIENT)
Dept: ENDOCRINOLOGY | Age: 47
End: 2022-02-28
Payer: COMMERCIAL

## 2022-02-28 VITALS
SYSTOLIC BLOOD PRESSURE: 123 MMHG | OXYGEN SATURATION: 98 % | DIASTOLIC BLOOD PRESSURE: 75 MMHG | HEART RATE: 94 BPM | HEIGHT: 72 IN | RESPIRATION RATE: 18 BRPM | WEIGHT: 202 LBS | BODY MASS INDEX: 27.36 KG/M2

## 2022-02-28 DIAGNOSIS — E13.9 LADA (LATENT AUTOIMMUNE DIABETES IN ADULTS), MANAGED AS TYPE 1 (HCC): Primary | ICD-10-CM

## 2022-02-28 DIAGNOSIS — E78.5 HYPERLIPIDEMIA, UNSPECIFIED HYPERLIPIDEMIA TYPE: ICD-10-CM

## 2022-02-28 PROCEDURE — 99214 OFFICE O/P EST MOD 30 MIN: CPT | Performed by: NURSE PRACTITIONER

## 2022-02-28 NOTE — PROGRESS NOTES
[address] [place] [address] Department of Endocrinology Diabetes and Metabolism   [address] [facility] [address]   Phone: [phone]  Fax: [phone]    Date of Service: 2/28/2022    Primary Care Physician: Liang Harrison MD  Referring physician: No ref. provider found  Provider: ANNABEL Kim NP     Reason for the visit:  DM Type 1 - IRMA    History of Present Illness: The history is provided by the patient. No  was used. Accuracy of the patient data is excellent. He is accompanied today by his wife. Fanny Tolbert is a very pleasant 56 y.o. male seen today for diabetes management. He was recently admitted to [facility] on 1/26/2022 because of nausea and weakness and found to be in DKA, A1c 11.5%. No hx of DM. Fanny Tolbert was diagnosed with diabetes at age 40 during recent hospitalization and currently on Lantus 16 u daily, Humalog 5/6/7 u with meals  plus Medium dose sliding scale. Recent ANGELIQUE antibody +  > 250; pt RIMA onset    The patient has been checking blood sugar before meals and at HS  He keeps a log of all of his meals and BS and insulin doses. He has recorded lows. Most recent A1c results summarized below  Lab Results   Component Value Date    LABA1C 11.5 01/26/2022     Patient reported  hypoglycemic episodes with hypoglycemic awarenss  The patient has been mindful of what has been eating and following diabetic diet as encouraged   Keeping a very well detailed log, which he brought for review  I reviewed current medications and the patient has no issues with diabetes medications  Clayton Shankar The patient is due for an eye exam. No h/o diabetic retinopathy  The patient performs own feet care.   Microvascular complications:  No Retinopathy, Nephropathy or Neuropathy   Macrovascular complications: no CAD, PVD, or Stroke  The patient does not receive Flushot     PAST MEDICAL HISTORY   Past Medical History:   Diagnosis Date    Pain in left knee 05/2019       PAST SURGICAL HISTORY   Past Surgical History:   Procedure Laterality Date    FOOT SURGERY Left as youth    PATELLAR TENDON REPAIR Left 6/3/2019    LEFT LEG PATELLA TENDON REPAIR performed by Hannah Diaz MD at [address]   Tobacco:   reports that he has never smoked. He has never used smokeless tobacco.  Alcohol:   reports current alcohol use. Drugs:   reports current drug use. Drug: Marijuana [place]). FAMILY HISTORY   Family History   Problem Relation Age of Onset    Diabetes Mother     Hypertension Father        ALLERGIES AND DRUG REACTIONS   No Known Allergies    CURRENT MEDICATIONS   Current Outpatient Medications   Medication Sig Dispense Refill    atorvastatin (LIPITOR) 10 MG tablet Take 1 tablet by mouth daily 90 tablet 1    lisinopril (PRINIVIL;ZESTRIL) 5 MG tablet Take 1 tablet by mouth daily 30 tablet 0    insulin glargine (LANTUS SOLOSTAR) 100 UNIT/ML injection pen Inject 20 Units into the skin every morning (Patient taking differently: Inject 20 Units into the skin at bedtime 16 units at pain) 5 pen 3    insulin lispro, 1 Unit Dial, (HUMALOG KWIKPEN) 100 UNIT/ML SOPN Inject 6 units with meals + following sliding scale. -200 add 2U, -250 add 4U, -300 add 6U, -350 add 8U, -400 add 12U, BS over 400 add 12U. MAX 30U/day 5 pen 3    Insulin Pen Needle (BD PEN NEEDLE MICRO U/F) 32G X 6 MM MISC Uses with insulin 4 times a day 250 each 5    ONE TOUCH ULTRASOFT LANCETS MISC Test 4 times/day before meals and at bedtime and as needed for symptoms of irregular blood glucose. 250 each 5    Blood Glucose Monitoring Suppl MISC OneTouch ultra Glucometer 1 each 0    blood glucose monitor strips One-Touch Ultra strips.  Check 4 times/day before meals and at bedtime and as needed for symptoms of irregular blood glucose 250 strip 5    sildenafil (REVATIO) 20 MG tablet Take 2 tablets by mouth daily as needed (erectile dysfunction) 60 tablet 5    Multiple Vitamins-Minerals (THERAPEUTIC MULTIVITAMIN-MINERALS) tablet Take 1 tablet by mouth daily Ld 05/29/2019       No current facility-administered medications for this visit. Review of Systems  Constitutional: No fever, no chills, no diaphoresis, no generalized weakness. HEENT: No blurred vision, No sore throat, no ear pain, no hair loss  Neck: denied any neck swelling, difficulty swallowing,   Cardio-pulmonary: No CP, SOB or palpitation, No orthopnea or PND. No cough or wheezing. GI: No N/V/D, no constipation, No abdominal pain, no melena or hematochezia   : Denied any dysuria, hematuria, flank pain, discharge, or incontinence. Skin: denied any rash, ulcer, Hirsute, or hyperpigmentation. MSK: denied any joint deformity, joint pain/swelling, muscle pain, or back pain. Neuro: no numbness, no tingling, no weakness    OBJECTIVE    /75   Pulse 94   Resp 18   Ht 6' (1.829 m)   Wt 202 lb (91.6 kg)   SpO2 98%   BMI 27.40 kg/m²   BP Readings from Last 4 Encounters:   02/28/22 123/75   01/31/22 (!) 152/77   01/28/22 138/80   03/06/20 (!) 142/90     Wt Readings from Last 6 Encounters:   02/28/22 202 lb (91.6 kg)   01/31/22 184 lb (83.5 kg)   01/28/22 177 lb (80.3 kg)   03/06/20 200 lb (90.7 kg)   09/13/19 199 lb 3.2 oz (90.4 kg)   05/31/19 195 lb (88.5 kg)       Physical examination:  General: awake alert, oriented x3, no abnormal position or movements. HEENT: normocephalic non-traumatic, no exophthalmos   Neck: supple, no LN enlargement, no thyromegaly, no thyroid tenderness, no JVD. Pulm: Clear equal air entry no added sounds, no wheezing or rhonchi    CVS: S1 + S2, no murmur, no heave. Abd: soft lax, no tenderness, no organomegaly, audible bowel sounds. Skin: warm, no lesions, no rash.  No callus, no Ulcers, No acanthosis nigricans  Musculoskeletal: No back tenderness, no kyphosis/scoliosis    Neuro: CN intact,  Sensation present bilateral , muscle power normal  Psych: · Pt to call if BS <70, 3 consecutive recordings  · Will start [place] for CGM  · Discussed insulin pump therapy with pt  · Will check ANGELIQUE antibody > 250; + DM Type 1  · Discussed with patient A1c and blood sugar goals   · Optimal blood sugars: 100-140 pre-prandial, < 180 peak post-prandial  · The patient counseled about the complications of uncontrolled diabetes   · Patient will need routine diabetes maintenance and prevention  · Pt received DM education  · Discussed lifestyle changes including diet and exercise with patient  · A1c with next OV                      2.       Hyperlipidemia, unspecified hyperlipidemia type   · Lipid panel WNL  · Pt started on statin             I personally spent > 30 minutes reviewing external notes from PCP and other patient's care team providers, and personally interpreted labs associated with the above diagnosis. I also ordered labs to further assess and manage the above addressed medical conditions. Return in about 3 months (around 5/28/2022). The above issues were reviewed with the patient who understood and agreed with the plan. Thank you for allowing us to participate in the care of this patient. Please do not hesitate to contact us with any additional questions. ANNABEL Hernandez NP     [facility] Diabetes Care and Endocrinology   [address] [place] [address]   Phone: [phone]  Fax: [phone]  --------------------------------------------  An electronic signature was used to authenticate this note. ANNABEL Hernandez NP  on 2/28/2022 at 2:09 PM   Post-Discharge Transitional Care Management Services or Hospital Follow Up      Alexys Neumann   YOB: 1975    Date of Office Visit:  2/28/2022  Date of Hospital Admission: 1/26/22  Date of Hospital Discharge: 1/28/22  Risk of hospital readmission (high >=14%.  Medium >=10%) :Readmission Risk Score: 7.3 ( )      Care management risk score Rising risk (score 2-5) and Complex Care (Scores >=6): 1     Non face to face  following discharge, date last encounter closed (first attempt may have been earlier): *No documented post hospital discharge outreach found in the last 14 days    Call initiated 2 business days of discharge: *No response recorded in the last 14 days    Patient Active Problem List   Diagnosis    DKA (diabetic ketoacidosis) ([facility]    Diabetes mellitus, new onset ([facility]    High anion gap metabolic acidosis       No Known Allergies    Medications listed as ordered at the time of discharge from hospital     Medication List          Accurate as of February 28, 2022  2:09 PM. If you have any questions, ask your nurse or doctor. CHANGE how you take these medications    Lantus SoloStar 100 UNIT/ML injection pen  Generic drug: insulin glargine  Inject 20 Units into the skin every morning  What changed:   · when to take this  · additional instructions        CONTINUE taking these medications    atorvastatin 10 MG tablet  Commonly known as: LIPITOR  Take 1 tablet by mouth daily     BD Pen Needle Micro U/F 32G X 6 MM Misc  Generic drug: Insulin Pen Needle  Uses with insulin 4 times a day     Blood Glucose Monitoring Suppl Misc  OneTouch ultra Glucometer     blood glucose test strips  One-Touch Ultra strips. Check 4 times/day before meals and at bedtime and as needed for symptoms of irregular blood glucose     insulin lispro (1 Unit Dial) 100 UNIT/ML Sopn  Commonly known as: HumaLOG KwikPen  Inject 6 units with meals + following sliding scale. -200 add 2U, -250 add 4U, -300 add 6U, -350 add 8U, -400 add 12U, BS over 400 add 12U. MAX 30U/day     lisinopril 5 MG tablet  Commonly known as: PRINIVIL;ZESTRIL  Take 1 tablet by mouth daily     ONE TOUCH ULTRASOFT LANCETS Misc  Test 4 times/day before meals and at bedtime and as needed for symptoms of irregular blood glucose.      sildenafil 20 MG tablet  Commonly known as: REVATIO  Take 2 tablets by mouth daily as needed (erectile dysfunction)     therapeutic multivitamin-minerals tablet              Medications marked \"taking\" at this time  Outpatient Medications Marked as Taking for the 2/28/22 encounter (Office Visit) with ANNABEL De La Cruz NP   Medication Sig Dispense Refill    atorvastatin (LIPITOR) 10 MG tablet Take 1 tablet by mouth daily 90 tablet 1    lisinopril (PRINIVIL;ZESTRIL) 5 MG tablet Take 1 tablet by mouth daily 30 tablet 0    insulin glargine (LANTUS SOLOSTAR) 100 UNIT/ML injection pen Inject 20 Units into the skin every morning (Patient taking differently: Inject 20 Units into the skin at bedtime 16 units at pain) 5 pen 3    insulin lispro, 1 Unit Dial, (HUMALOG KWIKPEN) 100 UNIT/ML SOPN Inject 6 units with meals + following sliding scale. -200 add 2U, -250 add 4U, -300 add 6U, -350 add 8U, -400 add 12U, BS over 400 add 12U. MAX 30U/day 5 pen 3    Insulin Pen Needle (BD PEN NEEDLE MICRO U/F) 32G X 6 MM MISC Uses with insulin 4 times a day 250 each 5    ONE TOUCH ULTRASOFT LANCETS MISC Test 4 times/day before meals and at bedtime and as needed for symptoms of irregular blood glucose. 250 each 5    Blood Glucose Monitoring Suppl MISC OneTouch ultra Glucometer 1 each 0    blood glucose monitor strips One-Touch Ultra strips.  Check 4 times/day before meals and at bedtime and as needed for symptoms of irregular blood glucose 250 strip 5    sildenafil (REVATIO) 20 MG tablet Take 2 tablets by mouth daily as needed (erectile dysfunction) 60 tablet 5    Multiple Vitamins-Minerals (THERAPEUTIC MULTIVITAMIN-MINERALS) tablet Take 1 tablet by mouth daily Ld 05/29/2019          Medications patient taking as of now reconciled against medications ordered at time of hospital discharge: No    Chief Complaint   Patient presents with    Follow-up    Diabetes         Vitals:    02/28/22 1329   BP: 123/75   Pulse: 94   Resp: 18   SpO2: 98%   Weight: 202 lb (91.6 kg) Height: 6' (1.829 m)     Body mass index is 27.4 kg/m².    Wt Readings from Last 3 Encounters:   02/28/22 202 lb (91.6 kg)   01/31/22 184 lb (83.5 kg)   01/28/22 177 lb (80.3 kg)     BP Readings from Last 3 Encounters:   02/28/22 123/75   01/31/22 (!) 152/77   01/28/22 138/80

## 2022-11-28 DIAGNOSIS — E13.9 LADA (LATENT AUTOIMMUNE DIABETES IN ADULTS), MANAGED AS TYPE 1 (HCC): Primary | ICD-10-CM

## 2022-11-28 RX ORDER — INSULIN LISPRO 100 [IU]/ML
INJECTION, SOLUTION INTRAVENOUS; SUBCUTANEOUS
Qty: 15 ML | Refills: 0 | Status: SHIPPED | OUTPATIENT
Start: 2022-11-28

## 2022-11-28 RX ORDER — INSULIN LISPRO 100 [IU]/ML
INJECTION, SOLUTION INTRAVENOUS; SUBCUTANEOUS
Qty: 15 ML | OUTPATIENT
Start: 2022-11-28

## 2022-12-15 ENCOUNTER — OFFICE VISIT (OUTPATIENT)
Dept: ENDOCRINOLOGY | Age: 47
End: 2022-12-15

## 2022-12-15 VITALS
HEART RATE: 84 BPM | WEIGHT: 216 LBS | HEIGHT: 72 IN | DIASTOLIC BLOOD PRESSURE: 88 MMHG | BODY MASS INDEX: 29.26 KG/M2 | SYSTOLIC BLOOD PRESSURE: 118 MMHG | RESPIRATION RATE: 16 BRPM | OXYGEN SATURATION: 99 %

## 2022-12-15 DIAGNOSIS — E55.9 VITAMIN D DEFICIENCY: ICD-10-CM

## 2022-12-15 DIAGNOSIS — E78.5 HYPERLIPIDEMIA, UNSPECIFIED HYPERLIPIDEMIA TYPE: ICD-10-CM

## 2022-12-15 DIAGNOSIS — E13.9 LADA (LATENT AUTOIMMUNE DIABETES IN ADULTS), MANAGED AS TYPE 1 (HCC): Primary | ICD-10-CM

## 2022-12-15 DIAGNOSIS — Z91.119 DIETARY NONCOMPLIANCE: ICD-10-CM

## 2022-12-15 LAB — HBA1C MFR BLD: 11.4 %

## 2022-12-15 NOTE — PROGRESS NOTES
[address] [place] [address] Department of Endocrinology Diabetes and Metabolism   [address] [facility] [address]   Phone: [phone]  Fax: [phone]    Date of Service: 12/15/2022    Primary Care Physician: Naima Luciano MD  Referring physician: No ref. provider found  Provider: ANNABEL Polanco NP     Reason for the visit:  DM Type 1 - IRMA    History of Present Illness: The history is provided by the patient. No  was used. Accuracy of the patient data is excellent. Deana Fox is a very pleasant 56 y.o. male seen today for diabetes management. He was recently admitted to [address] [place] [address] on 1/26/2022 because of nausea and weakness and found to be in DKA, A1c 11.5%. No hx of DM. Last appt was 2/28/22    Deana Fox was diagnosed with diabetes at age 40 during recent hospitalization and currently on Lantus 16 u daily, Humalog 5/6/7 u with meals  plus Medium dose sliding scale. Recent ANGELIQUE antibody +  > 250; pt IRMA onset    The patient has been checking blood sugar via Dior  TIR 9%  Hyperglycemia  91%  Lows  0%  AVG BS  290    GMI  10.2%    Most recent A1c results summarized below  Lab Results   Component Value Date/Time    LABA1C 11.4 12/15/2022 08:04 AM    LABA1C 11.5 01/26/2022 05:29 PM     Patient reported  hypoglycemic episodes with hypoglycemic awarenss  The patient has not  been mindful of what has been eating and following diabetic diet as encouraged  I reviewed current medications and the patient has no issues with diabetes medications  Clayton Waddell up to date for an eye exam. No h/o diabetic retinopathy  The patient performs own feet care.   Microvascular complications:  No Retinopathy, Nephropathy or Neuropathy   Macrovascular complications: no CAD, PVD, or Stroke  The patient does not receive Flushot     PAST MEDICAL HISTORY   Past Medical History:   Diagnosis Date    Pain in left knee 05/2019       PAST SURGICAL HISTORY   Past Review of Systems  Constitutional: No fever, no chills, no diaphoresis, no generalized weakness. HEENT: No blurred vision, No sore throat, no ear pain, no hair loss  Neck: denied any neck swelling, difficulty swallowing,   Cardio-pulmonary: No CP, SOB or palpitation, No orthopnea or PND. No cough or wheezing. GI: No N/V/D, no constipation, No abdominal pain, no melena or hematochezia   : Denied any dysuria, hematuria, flank pain, discharge, or incontinence. Skin: denied any rash, ulcer, Hirsute, or hyperpigmentation. MSK: denied any joint deformity, joint pain/swelling, muscle pain, or back pain. Neuro: no numbness, no tingling, no weakness    OBJECTIVE    /88   Pulse 84   Resp 16   Ht 6' (1.829 m)   Wt 216 lb (98 kg)   SpO2 99%   BMI 29.29 kg/m²   BP Readings from Last 4 Encounters:   12/15/22 118/88   02/28/22 123/75   01/31/22 (!) 152/77   01/28/22 138/80     Wt Readings from Last 6 Encounters:   12/15/22 216 lb (98 kg)   02/28/22 202 lb (91.6 kg)   01/31/22 184 lb (83.5 kg)   01/28/22 177 lb (80.3 kg)   03/06/20 200 lb (90.7 kg)   09/13/19 199 lb 3.2 oz (90.4 kg)       Physical examination:  General: awake alert, oriented x3, no abnormal position or movements. HEENT: normocephalic non-traumatic, no exophthalmos   Neck: supple, no LN enlargement, no thyromegaly, no thyroid tenderness, no JVD. Pulm: Clear equal air entry no added sounds, no wheezing or rhonchi    CVS: S1 + S2, no murmur, no heave. Abd: soft lax, no tenderness, no organomegaly, audible bowel sounds. Skin: warm, no lesions, no rash.  No callus, no Ulcers, No acanthosis nigricans  Musculoskeletal: No back tenderness, no kyphosis/scoliosis    Neuro: CN intact,  Sensation present bilateral , muscle power normal  Psych: normal mood, and affect      Review of Laboratory Data:  I personally reviewed the following lab:  Lab Results   Component Value Date/Time    WBC 4.4 (L) 01/28/2022 03:12 AM    RBC 4.50 01/28/2022 03:12 AM HGB 12.6 01/28/2022 03:12 AM    HCT 37.6 01/28/2022 03:12 AM    MCV 83.6 01/28/2022 03:12 AM    MCH 28.0 01/28/2022 03:12 AM    MCHC 33.5 01/28/2022 03:12 AM    RDW 12.6 01/28/2022 03:12 AM     01/28/2022 03:12 AM    MPV 10.1 01/28/2022 03:12 AM      Lab Results   Component Value Date/Time     01/28/2022 03:12 AM    K 3.6 01/28/2022 03:12 AM    K 3.8 01/27/2022 08:03 AM    CO2 21 (L) 01/28/2022 03:12 AM    BUN 8 01/28/2022 03:12 AM    CREATININE 0.9 01/28/2022 03:12 AM    CALCIUM 8.8 01/28/2022 03:12 AM    LABGLOM >60 01/28/2022 03:12 AM    GFRAA >60 01/28/2022 03:12 AM      No results found for: TSH, T4FREE, Z5SEIQF, FT3, F1RMUKQ, TSI, TPOABS, THGAB  Lab Results   Component Value Date/Time    LABA1C 11.4 12/15/2022 08:04 AM    GLUCOSE 156 01/28/2022 03:12 AM     Lab Results   Component Value Date/Time    LABA1C 11.4 12/15/2022 08:04 AM    LABA1C 11.5 01/26/2022 05:29 PM     Lab Results   Component Value Date/Time    TRIG 51 02/17/2022 09:37 AM    HDL 55 02/17/2022 09:37 AM    LDLCALC 82 02/17/2022 09:37 AM    CHOL 147 02/17/2022 09:37 AM     Lab Results   Component Value Date/Time    VITD25 33 02/17/2022 09:37 AM       ASSESSMENT & RECOMMENDATIONS   Jan Vann, a 56 y.o.-old male seen in for the following issues       Assessment:      Diagnosis Orders   1. 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 Hyperlipidemia, unspecified hyperlipidemia type  LIPID PANEL      3. Vitamin D deficiency  Vitamin D 25 Hydroxy      4. Dietary noncompliance              Plan:     1. 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   Will make changes DM regimen to  Lantus 20 u daily, Humalog 8 u with meals  Plus Medium dose ANNABEL Hadley NP on 12/15/2022 at 8:20 AM

## 2022-12-28 ENCOUNTER — TELEPHONE (OUTPATIENT)
Dept: ENDOCRINOLOGY | Age: 47
End: 2022-12-28

## 2022-12-28 NOTE — TELEPHONE ENCOUNTER
I spoke with Yonathan Hogan about his blood sugars. Bharat Pierre asked that he increase his Humalog with meals to 10 units with each meal plus the sliding scale will stay the same. He understood and will start the increase at dinner time. He is to continue with the same dose for Lantus. If he has any questions or issue I asked him to please call us back.

## 2022-12-30 DIAGNOSIS — Z79.4 TYPE 2 DIABETES MELLITUS WITHOUT COMPLICATION, WITH LONG-TERM CURRENT USE OF INSULIN (HCC): Primary | ICD-10-CM

## 2022-12-30 DIAGNOSIS — E11.9 TYPE 2 DIABETES MELLITUS WITHOUT COMPLICATION, WITH LONG-TERM CURRENT USE OF INSULIN (HCC): Primary | ICD-10-CM

## 2023-01-03 RX ORDER — BLOOD-GLUCOSE SENSOR
EACH MISCELLANEOUS
Qty: 6 EACH | Refills: 3 | Status: SHIPPED | OUTPATIENT
Start: 2023-01-03

## 2023-01-06 DIAGNOSIS — E13.9 LADA (LATENT AUTOIMMUNE DIABETES IN ADULTS), MANAGED AS TYPE 1 (HCC): ICD-10-CM

## 2023-01-09 DIAGNOSIS — E13.9 LADA (LATENT AUTOIMMUNE DIABETES IN ADULTS), MANAGED AS TYPE 1 (HCC): ICD-10-CM

## 2023-01-09 RX ORDER — INSULIN GLARGINE 100 [IU]/ML
INJECTION, SOLUTION SUBCUTANEOUS
Qty: 30 ML | Refills: 2 | Status: SHIPPED | OUTPATIENT
Start: 2023-01-09

## 2023-01-09 RX ORDER — INSULIN LISPRO 100 [IU]/ML
INJECTION, SOLUTION INTRAVENOUS; SUBCUTANEOUS
Qty: 60 ML | Refills: 3 | Status: SHIPPED | OUTPATIENT
Start: 2023-01-09

## 2023-01-09 RX ORDER — PEN NEEDLE, DIABETIC 32 GX 1/4"
NEEDLE, DISPOSABLE MISCELLANEOUS
Qty: 400 EACH | Refills: 3 | Status: SHIPPED | OUTPATIENT
Start: 2023-01-09

## 2023-01-10 RX ORDER — INSULIN LISPRO 100 [IU]/ML
INJECTION, SOLUTION INTRAVENOUS; SUBCUTANEOUS
Qty: 15 ML | Refills: 0 | OUTPATIENT
Start: 2023-01-10

## 2023-02-15 ENCOUNTER — TELEMEDICINE (OUTPATIENT)
Dept: ENDOCRINOLOGY | Age: 48
End: 2023-02-15
Payer: COMMERCIAL

## 2023-02-15 DIAGNOSIS — E13.9 LADA (LATENT AUTOIMMUNE DIABETES IN ADULTS), MANAGED AS TYPE 1 (HCC): Primary | ICD-10-CM

## 2023-02-15 DIAGNOSIS — E55.9 VITAMIN D DEFICIENCY: ICD-10-CM

## 2023-02-15 DIAGNOSIS — E78.5 HYPERLIPIDEMIA, UNSPECIFIED HYPERLIPIDEMIA TYPE: ICD-10-CM

## 2023-02-15 DIAGNOSIS — Z91.119 DIETARY NONCOMPLIANCE: ICD-10-CM

## 2023-02-15 PROCEDURE — 99214 OFFICE O/P EST MOD 30 MIN: CPT | Performed by: NURSE PRACTITIONER

## 2023-02-15 PROCEDURE — 95251 CONT GLUC MNTR ANALYSIS I&R: CPT | Performed by: NURSE PRACTITIONER

## 2023-02-15 RX ORDER — INSULIN PMP CART,AUT,G6/7,CNTR
EACH SUBCUTANEOUS
Qty: 30 EACH | Refills: 4 | Status: SHIPPED | OUTPATIENT
Start: 2023-02-15

## 2023-02-15 RX ORDER — PROCHLORPERAZINE 25 MG/1
SUPPOSITORY RECTAL
Qty: 1 EACH | Refills: 3 | Status: SHIPPED | OUTPATIENT
Start: 2023-02-15

## 2023-02-15 RX ORDER — PROCHLORPERAZINE 25 MG/1
SUPPOSITORY RECTAL
Qty: 1 EACH | Refills: 0 | Status: SHIPPED | OUTPATIENT
Start: 2023-02-15

## 2023-02-15 RX ORDER — INSULIN PMP CART,AUT,G6/7,CNTR
EACH SUBCUTANEOUS
Qty: 1 KIT | Refills: 0 | Status: SHIPPED | OUTPATIENT
Start: 2023-02-15

## 2023-02-15 RX ORDER — PROCHLORPERAZINE 25 MG/1
SUPPOSITORY RECTAL
Qty: 3 EACH | Refills: 3 | Status: SHIPPED | OUTPATIENT
Start: 2023-02-15

## 2023-02-15 NOTE — PROGRESS NOTES
Yohan Rinaldi was read the following message We want to confirm that, for purposes of billing, this is a virtual visit with your provider for which we will submit a claim for reimbursement with your insurance company. You will be responsible for any copays, coinsurance amounts or other amounts not covered by your insurance company. If you do not accept this, unfortunately we will not be able to schedule or proceed with a virtual visit with the provider. Do you accept? Clayton responded Yes .
rash. No callus, no Ulcers, No acanthosis nigricans  Musculoskeletal: No back tenderness, no kyphosis/scoliosis    Neuro: CN intact,  Sensation present bilateral , muscle power normal  Psych: normal mood, and affect      Review of Laboratory Data:  I personally reviewed the following lab:  Lab Results   Component Value Date/Time    WBC 4.4 (L) 01/28/2022 03:12 AM    RBC 4.50 01/28/2022 03:12 AM    HGB 12.6 01/28/2022 03:12 AM    HCT 37.6 01/28/2022 03:12 AM    MCV 83.6 01/28/2022 03:12 AM    MCH 28.0 01/28/2022 03:12 AM    MCHC 33.5 01/28/2022 03:12 AM    RDW 12.6 01/28/2022 03:12 AM     01/28/2022 03:12 AM    MPV 10.1 01/28/2022 03:12 AM      Lab Results   Component Value Date/Time     01/28/2022 03:12 AM    K 3.6 01/28/2022 03:12 AM    K 3.8 01/27/2022 08:03 AM    CO2 21 (L) 01/28/2022 03:12 AM    BUN 8 01/28/2022 03:12 AM    CREATININE 0.9 01/28/2022 03:12 AM    CALCIUM 8.8 01/28/2022 03:12 AM    LABGLOM >60 01/28/2022 03:12 AM    GFRAA >60 01/28/2022 03:12 AM      No results found for: TSH, T4FREE, W7SBBTA, FT3, C1LKLJZ, TSI, TPOABS, THGAB  Lab Results   Component Value Date/Time    LABA1C 11.4 12/15/2022 08:04 AM    GLUCOSE 156 01/28/2022 03:12 AM     Lab Results   Component Value Date/Time    LABA1C 11.4 12/15/2022 08:04 AM    LABA1C 11.5 01/26/2022 05:29 PM     Lab Results   Component Value Date/Time    TRIG 51 02/17/2022 09:37 AM    HDL 55 02/17/2022 09:37 AM    LDLCALC 82 02/17/2022 09:37 AM    CHOL 147 02/17/2022 09:37 AM     Lab Results   Component Value Date/Time    VITD25 33 02/17/2022 09:37 AM       ASSESSMENT & RECOMMENDATIONS   Deana Fox, a 53 y.o.-old male seen in for the following issues       Assessment:      Diagnosis Orders   1.  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

## 2023-05-16 ENCOUNTER — OFFICE VISIT (OUTPATIENT)
Dept: ENDOCRINOLOGY | Age: 48
End: 2023-05-16
Payer: COMMERCIAL

## 2023-05-16 VITALS
HEIGHT: 72 IN | DIASTOLIC BLOOD PRESSURE: 83 MMHG | BODY MASS INDEX: 30.75 KG/M2 | SYSTOLIC BLOOD PRESSURE: 145 MMHG | HEART RATE: 91 BPM | WEIGHT: 227 LBS

## 2023-05-16 DIAGNOSIS — E55.9 VITAMIN D DEFICIENCY: ICD-10-CM

## 2023-05-16 DIAGNOSIS — E13.9 LADA (LATENT AUTOIMMUNE DIABETES IN ADULTS), MANAGED AS TYPE 1 (HCC): Primary | ICD-10-CM

## 2023-05-16 DIAGNOSIS — E78.5 HYPERLIPIDEMIA, UNSPECIFIED HYPERLIPIDEMIA TYPE: ICD-10-CM

## 2023-05-16 DIAGNOSIS — Z91.119 DIETARY NONCOMPLIANCE: ICD-10-CM

## 2023-05-16 LAB — HBA1C MFR BLD: 9.6 %

## 2023-05-16 PROCEDURE — 95251 CONT GLUC MNTR ANALYSIS I&R: CPT | Performed by: NURSE PRACTITIONER

## 2023-05-16 PROCEDURE — 83036 HEMOGLOBIN GLYCOSYLATED A1C: CPT | Performed by: NURSE PRACTITIONER

## 2023-05-16 PROCEDURE — 99214 OFFICE O/P EST MOD 30 MIN: CPT | Performed by: NURSE PRACTITIONER

## 2023-05-16 PROCEDURE — 3046F HEMOGLOBIN A1C LEVEL >9.0%: CPT | Performed by: NURSE PRACTITIONER

## 2023-05-16 NOTE — PROGRESS NOTES
[address] [place] [address] Department of Endocrinology Diabetes and Metabolism   [address] [facility] [address]   Phone: [phone]  Fax: [phone]    Date of Service: 5/17/2023    Primary Care Physician: Lonnie Arora MD  Referring physician: No ref. provider found  Provider: ANNABEL Albarran NP     Reason for the visit:  DM Type 1 - IRMA    History of Present Illness: The history is provided by the patient. No  was used. Accuracy of the patient data is excellent. Dionne Quinones is a very pleasant 53 y.o. male seen today for diabetes management. He was recently admitted to [facility] on 1/26/2022 because of nausea and weakness and found to be in DKA, A1c 11.5%. No hx of DM. Last appt was 2/28/22    Dionne Quinones was diagnosed with diabetes at age 40 during recent hospitalization and currently on Lantus 20 u daily at night , Humalog 10 u with meals  plus Medium dose sliding scale. Recent ANGELIQUE antibody +  > 250; pt IRMA onset    The patient has been checking blood sugar via Dior 3  TIR 40%  Hyperglycemia 58%  Lows 2%  AVG BS  195    GMI  8.0%    Most recent A1c results summarized below  Lab Results   Component Value Date/Time    LABA1C 9.6 05/16/2023 09:16 AM    LABA1C 11.4 12/15/2022 08:04 AM    LABA1C 11.5 01/26/2022 05:29 PM     Patient reported  hypoglycemic episodes with hypoglycemic awarenss  The patient has not  been mindful of what has been eating and following diabetic diet as encouraged  He skips breakfast   I reviewed current medications and the patient has no issues with diabetes medications  Clayton Waddell up to date for an eye exam. No h/o diabetic retinopathy  The patient performs own feet care.   Microvascular complications:  No Retinopathy, Nephropathy or Neuropathy   Macrovascular complications: no CAD, PVD, or Stroke  The patient does not receive Flushot     PAST MEDICAL HISTORY   Past Medical History:   Diagnosis Date    Pain

## 2023-05-17 RX ORDER — PROCHLORPERAZINE 25 MG/1
SUPPOSITORY RECTAL
Qty: 1 EACH | Refills: 0 | Status: SHIPPED | OUTPATIENT
Start: 2023-05-17

## 2023-05-17 RX ORDER — INSULIN PMP CART,AUT,G6/7,CNTR
EACH SUBCUTANEOUS
Qty: 30 EACH | Refills: 3 | Status: SHIPPED | OUTPATIENT
Start: 2023-05-17

## 2023-05-17 RX ORDER — PROCHLORPERAZINE 25 MG/1
SUPPOSITORY RECTAL
Qty: 1 EACH | Refills: 3 | Status: SHIPPED | OUTPATIENT
Start: 2023-05-17

## 2023-05-17 RX ORDER — INSULIN PMP CART,AUT,G6/7,CNTR
EACH SUBCUTANEOUS
Qty: 1 KIT | Refills: 0 | Status: SHIPPED | OUTPATIENT
Start: 2023-05-17

## 2023-05-17 RX ORDER — PROCHLORPERAZINE 25 MG/1
SUPPOSITORY RECTAL
Qty: 3 EACH | Refills: 3 | Status: SHIPPED | OUTPATIENT
Start: 2023-05-17

## 2023-05-19 ENCOUNTER — TELEPHONE (OUTPATIENT)
Dept: ENDOCRINOLOGY | Age: 48
End: 2023-05-19

## 2023-09-25 NOTE — PROGRESS NOTES
[address] [facility] Department of Endocrinology Diabetes and Metabolism   [address] [facility]. [address] Cornelia Redman, [address] [facility]    Phone: [phone]  Fax: [phone]    Date of Service: 9/26/2023    Primary Care Physician: Niya Rios MD  Referring physician: No ref. provider found  Provider: ANNABEL Sandoval - CNP     Reason for the visit:  DM Type 1 - IRMA    History of Present Illness: The history is provided by the patient. No  was used. Accuracy of the patient data is excellent. Marcus Horn is a very pleasant 53 y.o. male seen today for diabetes management. Marcus Horn was diagnosed with diabetes at age 40 during 2021 hospitalization for DKA (Recent ANGELIQUE antibody +  > 250; pt IRMA onset). He is currently on Lantus 22 u daily at night , Humalog 12 u with meals  plus Medium dose sliding scale. Admits to injecting and extra 6-8 units of Humalog at night due to his snacking causing hperglycemia. Waking with hypoglycemia. Did not start Omnipod 5 as patient states he is \"dragging his feet\". Does not have time for training at this time. Has at home. The patient has been checking blood sugar via Dexcom G6  TIR 49%  Hyperglycemia 47%  Lows 4%  AVG BS  178    GMI  7.6%    Blood sugar 76 in office today. Symptomatic. Improved with OJ and a few pieces of candy. Most recent A1c results summarized below  Lab Results   Component Value Date/Time    LABA1C 9.5 09/26/2023 07:47 AM    LABA1C 9.6 05/16/2023 09:16 AM    LABA1C 11.4 12/15/2022 08:04 AM     Patient reported  hypoglycemic episodes in early am and sometimes throughout workday. Patient works at [employer] and sometimes does nto get time to eat throughout day. He comes home and has large dinner with multiple snacks at night.   The patient has not  been mindful of what has been eating and following diabetic diet as encouraged  He skips breakfast   I reviewed current medications and the

## 2023-09-26 ENCOUNTER — OFFICE VISIT (OUTPATIENT)
Dept: ENDOCRINOLOGY | Age: 48
End: 2023-09-26
Payer: COMMERCIAL

## 2023-09-26 VITALS
BODY MASS INDEX: 28.85 KG/M2 | WEIGHT: 213 LBS | OXYGEN SATURATION: 99 % | RESPIRATION RATE: 18 BRPM | HEIGHT: 72 IN | SYSTOLIC BLOOD PRESSURE: 118 MMHG | HEART RATE: 58 BPM | DIASTOLIC BLOOD PRESSURE: 73 MMHG

## 2023-09-26 DIAGNOSIS — Z91.119 DIETARY NONCOMPLIANCE: ICD-10-CM

## 2023-09-26 DIAGNOSIS — E13.9 LADA (LATENT AUTOIMMUNE DIABETES IN ADULTS), MANAGED AS TYPE 1 (HCC): Primary | ICD-10-CM

## 2023-09-26 DIAGNOSIS — E55.9 VITAMIN D DEFICIENCY: ICD-10-CM

## 2023-09-26 DIAGNOSIS — E78.5 HYPERLIPIDEMIA, UNSPECIFIED HYPERLIPIDEMIA TYPE: ICD-10-CM

## 2023-09-26 LAB — HBA1C MFR BLD: 9.5 %

## 2023-09-26 PROCEDURE — 3046F HEMOGLOBIN A1C LEVEL >9.0%: CPT | Performed by: FAMILY MEDICINE

## 2023-09-26 PROCEDURE — 99214 OFFICE O/P EST MOD 30 MIN: CPT | Performed by: FAMILY MEDICINE

## 2023-09-26 PROCEDURE — 95251 CONT GLUC MNTR ANALYSIS I&R: CPT | Performed by: FAMILY MEDICINE

## 2023-09-26 PROCEDURE — 83036 HEMOGLOBIN GLYCOSYLATED A1C: CPT | Performed by: FAMILY MEDICINE

## 2023-11-08 NOTE — PATIENT CARE CONFERENCE
P Quality Flow/Interdisciplinary Rounds Progress Note        Quality Flow Rounds held on January 28, 2022    Disciplines Attending:  Bedside Nurse, ,  and Nursing Unit Leadership    Chuck Gracia was admitted on 1/26/2022  9:43 AM    Anticipated Discharge Date:       Disposition:    Samuel Score:  Samuel Scale Score: 22    Readmission Risk              Risk of Unplanned Readmission:  12           Discussed patient goal for the day, patient clinical progression, and barriers to discharge.   The following Goal(s) of the Day/Commitment(s) have been identified:  Discharge - Obtain Order      Dionna Stone RN  January 28, 2022 The patient is Watcher - Medium risk of patient condition declining or worsening    Shift Goals  Clinical Goals: Mobilize  Patient Goals: Rest  Family Goals: Sole    Progress made toward(s) clinical / shift goals:    Problem: Knowledge Deficit - Standard  Goal: Patient and family/care givers will demonstrate understanding of plan of care, disease process/condition, diagnostic tests and medications  Outcome: Progressing     Problem: Post Op Day 4 CABG/Heart Valve Replacement  Goal: Optimal care of the Post Op CABG/Heart Valve replacement Post Op Day 4  Outcome: Progressing  Intervention: Daily weights in the morning  Note: Done by NOC shift nurse  Intervention: Up in chair for all meals  Note: Patient has been up in chair for all meals  Intervention: IS q 1 hour while awake and record best IS volume  Note: Best 800  Intervention: Shower daily and clean incisions twice daily with soap and water  Note: Incision cleansed on day shift   Intervention: Ambulate 4 times daily, increasing the distance each time  Note: Patient walked 2 times during day shift and up to and from bed to chair multiple times.   Intervention: Consider removal of freeman, chest tube and pacer wires if not already done  Note: Chest tubes removed.      Problem: Hemodynamics  Goal: Patient's hemodynamics, fluid balance and neurologic status will be stable or improve  Outcome: Progressing     Problem: Bowel Elimination - Post Surgical  Goal: Patient will resume regular bowel sounds and function with no discomfort or distention  Outcome: Progressing     Problem: Pain - Standard  Goal: Alleviation of pain or a reduction in pain to the patient’s comfort goal  Outcome: Progressing       Patient is not progressing towards the following goals:

## 2023-11-17 DIAGNOSIS — E13.9 LADA (LATENT AUTOIMMUNE DIABETES IN ADULTS), MANAGED AS TYPE 1 (HCC): ICD-10-CM

## 2023-11-21 ENCOUNTER — TELEPHONE (OUTPATIENT)
Dept: ENDOCRINOLOGY | Age: 48
End: 2023-11-21

## 2023-11-21 RX ORDER — PROCHLORPERAZINE 25 MG/1
SUPPOSITORY RECTAL
Qty: 3 EACH | Refills: 11 | Status: SHIPPED | OUTPATIENT
Start: 2023-11-21

## 2023-12-08 DIAGNOSIS — E11.9 TYPE 2 DIABETES MELLITUS WITHOUT COMPLICATION, WITH LONG-TERM CURRENT USE OF INSULIN (HCC): Primary | ICD-10-CM

## 2023-12-08 DIAGNOSIS — Z79.4 TYPE 2 DIABETES MELLITUS WITHOUT COMPLICATION, WITH LONG-TERM CURRENT USE OF INSULIN (HCC): Primary | ICD-10-CM

## 2023-12-11 RX ORDER — INSULIN LISPRO 100 [IU]/ML
INJECTION, SOLUTION INTRAVENOUS; SUBCUTANEOUS
Qty: 30 ML | Refills: 5 | Status: SHIPPED | OUTPATIENT
Start: 2023-12-11

## 2024-02-26 ENCOUNTER — OFFICE VISIT (OUTPATIENT)
Dept: ENDOCRINOLOGY | Age: 49
End: 2024-02-26
Payer: COMMERCIAL

## 2024-02-26 VITALS
WEIGHT: 195.8 LBS | HEART RATE: 57 BPM | RESPIRATION RATE: 18 BRPM | SYSTOLIC BLOOD PRESSURE: 111 MMHG | BODY MASS INDEX: 26.52 KG/M2 | DIASTOLIC BLOOD PRESSURE: 75 MMHG | OXYGEN SATURATION: 97 % | HEIGHT: 72 IN

## 2024-02-26 DIAGNOSIS — E78.5 HYPERLIPIDEMIA, UNSPECIFIED HYPERLIPIDEMIA TYPE: ICD-10-CM

## 2024-02-26 DIAGNOSIS — E13.9 LADA (LATENT AUTOIMMUNE DIABETES IN ADULTS), MANAGED AS TYPE 1 (HCC): Primary | ICD-10-CM

## 2024-02-26 DIAGNOSIS — Z91.119 DIETARY NONCOMPLIANCE: ICD-10-CM

## 2024-02-26 DIAGNOSIS — E55.9 VITAMIN D DEFICIENCY: ICD-10-CM

## 2024-02-26 PROCEDURE — 95251 CONT GLUC MNTR ANALYSIS I&R: CPT | Performed by: FAMILY MEDICINE

## 2024-02-26 PROCEDURE — 99214 OFFICE O/P EST MOD 30 MIN: CPT | Performed by: FAMILY MEDICINE

## 2024-02-26 RX ORDER — LISINOPRIL 5 MG/1
5 TABLET ORAL DAILY
Qty: 90 TABLET | Refills: 3 | Status: SHIPPED | OUTPATIENT
Start: 2024-02-26

## 2024-02-26 NOTE — PROGRESS NOTES
no heave.   Abd: soft lax, no tenderness, no organomegaly, audible bowel sounds.   Skin: warm, no lesions, no rash. No callus, no Ulcers, No acanthosis nigricans  Musculoskeletal: No back tenderness, no kyphosis/scoliosis    Neuro: CN intact,  Sensation present bilateral , muscle power normal  Psych: normal mood, and affect      Review of Laboratory Data:  I personally reviewed the following lab:  Lab Results   Component Value Date/Time    WBC 4.4 (L) 01/28/2022 03:12 AM    RBC 4.50 01/28/2022 03:12 AM    HGB 12.6 01/28/2022 03:12 AM    HCT 37.6 01/28/2022 03:12 AM    MCV 83.6 01/28/2022 03:12 AM    MCH 28.0 01/28/2022 03:12 AM    MCHC 33.5 01/28/2022 03:12 AM    RDW 12.6 01/28/2022 03:12 AM     01/28/2022 03:12 AM    MPV 10.1 01/28/2022 03:12 AM      Lab Results   Component Value Date/Time     01/28/2022 03:12 AM    K 3.6 01/28/2022 03:12 AM    K 3.8 01/27/2022 08:03 AM    CO2 21 (L) 01/28/2022 03:12 AM    BUN 8 01/28/2022 03:12 AM    CREATININE 0.9 01/28/2022 03:12 AM    CALCIUM 8.8 01/28/2022 03:12 AM    LABGLOM >60 01/28/2022 03:12 AM    GFRAA >60 01/28/2022 03:12 AM      No results found for: \"TSH\", \"T4FREE\", \"Y7EZRQS\", \"FT3\", \"B9ZHYIA\", \"TSI\", \"TPOABS\", \"THGAB\"  Lab Results   Component Value Date/Time    LABA1C 9.3 12/18/2023 03:30 PM    GLUCOSE 156 01/28/2022 03:12 AM     Lab Results   Component Value Date/Time    LABA1C 9.3 12/18/2023 03:30 PM    LABA1C 9.5 09/26/2023 07:47 AM    LABA1C 9.6 05/16/2023 09:16 AM     Lab Results   Component Value Date/Time    TRIG 51 02/17/2022 09:37 AM    HDL 55 02/17/2022 09:37 AM    LDLCALC 82 02/17/2022 09:37 AM    CHOL 147 02/17/2022 09:37 AM     Lab Results   Component Value Date/Time    VITD25 33 02/17/2022 09:37 AM       ASSESSMENT & RECOMMENDATIONS   Clayton Waddell, a 48 y.o.-old male seen in for the following issues       Assessment:      Diagnosis Orders   1. 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

## 2024-05-02 DIAGNOSIS — E13.9 LADA (LATENT AUTOIMMUNE DIABETES IN ADULTS), MANAGED AS TYPE 1 (HCC): ICD-10-CM

## 2024-05-02 RX ORDER — PROCHLORPERAZINE 25 MG/1
SUPPOSITORY RECTAL
Qty: 3 EACH | Refills: 11 | Status: SHIPPED | OUTPATIENT
Start: 2024-05-02

## 2024-05-13 ENCOUNTER — HOSPITAL ENCOUNTER (OUTPATIENT)
Age: 49
Discharge: HOME | End: 2024-05-13
Payer: COMMERCIAL

## 2024-05-13 DIAGNOSIS — E55.9 VITAMIN D DEFICIENCY: ICD-10-CM

## 2024-05-13 DIAGNOSIS — E78.5 HYPERLIPIDEMIA, UNSPECIFIED HYPERLIPIDEMIA TYPE: ICD-10-CM

## 2024-05-13 DIAGNOSIS — E13.9 LADA (LATENT AUTOIMMUNE DIABETES IN ADULTS), MANAGED AS TYPE 1 (HCC): ICD-10-CM

## 2024-05-13 LAB
25(OH)D3 SERPL-MCNC: 32.4 NG/ML (ref 30–100)
ALBUMIN SERPL-MCNC: 4.4 G/DL (ref 3.5–5.2)
ALP SERPL-CCNC: 75 U/L (ref 40–129)
ALT SERPL-CCNC: 12 U/L (ref 0–40)
ANION GAP SERPL CALCULATED.3IONS-SCNC: 7 MMOL/L (ref 7–16)
AST SERPL-CCNC: 14 U/L (ref 0–39)
BASOPHILS # BLD: 0.04 K/UL (ref 0–0.2)
BASOPHILS NFR BLD: 1 % (ref 0–2)
BILIRUB SERPL-MCNC: 0.3 MG/DL (ref 0–1.2)
BUN SERPL-MCNC: 11 MG/DL (ref 6–20)
CALCIUM SERPL-MCNC: 9 MG/DL (ref 8.6–10.2)
CHLORIDE SERPL-SCNC: 103 MMOL/L (ref 98–107)
CHOLEST SERPL-MCNC: 133 MG/DL
CO2 SERPL-SCNC: 28 MMOL/L (ref 22–29)
CREAT SERPL-MCNC: 1 MG/DL (ref 0.7–1.2)
CREAT UR-MCNC: 293.7 MG/DL (ref 40–278)
EOSINOPHIL # BLD: 0.11 K/UL (ref 0.05–0.5)
EOSINOPHILS RELATIVE PERCENT: 3 % (ref 0–6)
ERYTHROCYTE [DISTWIDTH] IN BLOOD BY AUTOMATED COUNT: 13.2 % (ref 11.5–15)
GFR, ESTIMATED: 90 ML/MIN/1.73M2
GLUCOSE SERPL-MCNC: 220 MG/DL (ref 74–99)
HCT VFR BLD AUTO: 45.7 % (ref 37–54)
HDLC SERPL-MCNC: 50 MG/DL
HGB BLD-MCNC: 14.3 G/DL (ref 12.5–16.5)
IMM GRANULOCYTES # BLD AUTO: <0.03 K/UL (ref 0–0.58)
IMM GRANULOCYTES NFR BLD: 1 % (ref 0–5)
LDLC SERPL CALC-MCNC: 72 MG/DL
LYMPHOCYTES NFR BLD: 1.55 K/UL (ref 1.5–4)
LYMPHOCYTES RELATIVE PERCENT: 35 % (ref 20–42)
MCH RBC QN AUTO: 28 PG (ref 26–35)
MCHC RBC AUTO-ENTMCNC: 31.3 G/DL (ref 32–34.5)
MCV RBC AUTO: 89.4 FL (ref 80–99.9)
MICROALBUMIN UR-MCNC: <12 MG/L (ref 0–19)
MICROALBUMIN/CREAT UR-RTO: ABNORMAL MCG/MG CREAT (ref 0–30)
MONOCYTES NFR BLD: 0.47 K/UL (ref 0.1–0.95)
MONOCYTES NFR BLD: 11 % (ref 2–12)
NEUTROPHILS NFR BLD: 50 % (ref 43–80)
NEUTS SEG NFR BLD: 2.23 K/UL (ref 1.8–7.3)
PLATELET # BLD AUTO: 258 K/UL (ref 130–450)
PMV BLD AUTO: 9.5 FL (ref 7–12)
POTASSIUM SERPL-SCNC: 3.9 MMOL/L (ref 3.5–5)
PROT SERPL-MCNC: 7.3 G/DL (ref 6.4–8.3)
RBC # BLD AUTO: 5.11 M/UL (ref 3.8–5.8)
SODIUM SERPL-SCNC: 138 MMOL/L (ref 132–146)
TRIGL SERPL-MCNC: 54 MG/DL
VLDLC SERPL CALC-MCNC: 11 MG/DL
WBC OTHER # BLD: 4.4 K/UL (ref 4.5–11.5)

## 2024-05-13 PROCEDURE — 85025 COMPLETE CBC W/AUTO DIFF WBC: CPT

## 2024-05-13 PROCEDURE — 80053 COMPREHEN METABOLIC PANEL: CPT

## 2024-05-13 PROCEDURE — 80061 LIPID PANEL: CPT

## 2024-05-13 PROCEDURE — 36415 COLL VENOUS BLD VENIPUNCTURE: CPT

## 2024-05-13 PROCEDURE — 82043 UR ALBUMIN QUANTITATIVE: CPT

## 2024-05-13 PROCEDURE — 82306 VITAMIN D 25 HYDROXY: CPT

## 2024-05-13 PROCEDURE — 82570 ASSAY OF URINE CREATININE: CPT

## 2024-05-28 ENCOUNTER — OFFICE VISIT (OUTPATIENT)
Dept: ENDOCRINOLOGY | Age: 49
End: 2024-05-28
Payer: COMMERCIAL

## 2024-05-28 VITALS
WEIGHT: 191 LBS | SYSTOLIC BLOOD PRESSURE: 116 MMHG | BODY MASS INDEX: 26.74 KG/M2 | HEART RATE: 58 BPM | DIASTOLIC BLOOD PRESSURE: 82 MMHG | HEIGHT: 71 IN | OXYGEN SATURATION: 98 % | RESPIRATION RATE: 18 BRPM

## 2024-05-28 DIAGNOSIS — E55.9 VITAMIN D DEFICIENCY: ICD-10-CM

## 2024-05-28 DIAGNOSIS — E78.5 HYPERLIPIDEMIA, UNSPECIFIED HYPERLIPIDEMIA TYPE: ICD-10-CM

## 2024-05-28 DIAGNOSIS — E13.9 LADA (LATENT AUTOIMMUNE DIABETES IN ADULTS), MANAGED AS TYPE 1 (HCC): Primary | ICD-10-CM

## 2024-05-28 DIAGNOSIS — Z91.119 DIETARY NONCOMPLIANCE: ICD-10-CM

## 2024-05-28 LAB — HBA1C MFR BLD: 8.2 %

## 2024-05-28 PROCEDURE — 83036 HEMOGLOBIN GLYCOSYLATED A1C: CPT | Performed by: FAMILY MEDICINE

## 2024-05-28 PROCEDURE — 99214 OFFICE O/P EST MOD 30 MIN: CPT | Performed by: FAMILY MEDICINE

## 2024-05-28 PROCEDURE — 95251 CONT GLUC MNTR ANALYSIS I&R: CPT | Performed by: FAMILY MEDICINE

## 2024-05-28 PROCEDURE — 3052F HG A1C>EQUAL 8.0%<EQUAL 9.0%: CPT | Performed by: FAMILY MEDICINE

## 2024-05-28 NOTE — PROGRESS NOTES
personally spent > 30 minutes reviewing external notes from PCP and other patient's care team providers, and personally interpreted labs associated with the above diagnosis. I also ordered labs to further assess and manage the above addressed medical conditions.     Return in about 3 months (around 8/28/2024) for Type 1 DM.    The above issues were reviewed with the patient who understood and agreed with the plan.    Thank you for allowing us to participate in the care of this patient. Please do not hesitate to contact us with any additional questions.     ANNABEL Siu CNP    [facility]   [address] [place] [address].  [address]  [place] [address]  Phone: [phone]  Fax: [phone]   --------------------------------------------  An electronic signature was used to authenticate this note.ANNABEL Siu CNP on 5/28/2024 at 4:16 PM

## 2024-06-12 DIAGNOSIS — E13.9 LADA (LATENT AUTOIMMUNE DIABETES IN ADULTS), MANAGED AS TYPE 1 (HCC): ICD-10-CM

## 2024-06-12 RX ORDER — IBUPROFEN 200 MG
1 TABLET ORAL DAILY
Qty: 50 EACH | Refills: 3 | Status: SHIPPED | OUTPATIENT
Start: 2024-06-12

## 2024-06-12 RX ORDER — INSULIN PMP CART,AUT,G6/7,CNTR
EACH SUBCUTANEOUS
Qty: 30 EACH | Refills: 3 | Status: SHIPPED | OUTPATIENT
Start: 2024-06-12

## 2024-08-09 DIAGNOSIS — E13.9 LADA (LATENT AUTOIMMUNE DIABETES IN ADULTS), MANAGED AS TYPE 1 (HCC): ICD-10-CM

## 2024-08-09 RX ORDER — PROCHLORPERAZINE 25 MG/1
SUPPOSITORY RECTAL
Qty: 1 EACH | Refills: 3 | Status: SHIPPED | OUTPATIENT
Start: 2024-08-09

## 2024-09-18 ENCOUNTER — OFFICE VISIT (OUTPATIENT)
Dept: ENDOCRINOLOGY | Age: 49
End: 2024-09-18
Payer: COMMERCIAL

## 2024-09-18 VITALS
SYSTOLIC BLOOD PRESSURE: 126 MMHG | HEIGHT: 72 IN | WEIGHT: 196 LBS | DIASTOLIC BLOOD PRESSURE: 80 MMHG | BODY MASS INDEX: 26.55 KG/M2

## 2024-09-18 DIAGNOSIS — E13.9 LADA (LATENT AUTOIMMUNE DIABETES IN ADULTS), MANAGED AS TYPE 1 (HCC): Primary | ICD-10-CM

## 2024-09-18 DIAGNOSIS — E11.9 TYPE 2 DIABETES MELLITUS WITHOUT COMPLICATION, WITH LONG-TERM CURRENT USE OF INSULIN (HCC): ICD-10-CM

## 2024-09-18 DIAGNOSIS — Z79.4 TYPE 2 DIABETES MELLITUS WITHOUT COMPLICATION, WITH LONG-TERM CURRENT USE OF INSULIN (HCC): ICD-10-CM

## 2024-09-18 DIAGNOSIS — Z91.119 DIETARY NONCOMPLIANCE: ICD-10-CM

## 2024-09-18 DIAGNOSIS — E55.9 VITAMIN D DEFICIENCY: ICD-10-CM

## 2024-09-18 DIAGNOSIS — E78.5 HYPERLIPIDEMIA, UNSPECIFIED HYPERLIPIDEMIA TYPE: ICD-10-CM

## 2024-09-18 LAB — HBA1C MFR BLD: 9.1 %

## 2024-09-18 PROCEDURE — 3046F HEMOGLOBIN A1C LEVEL >9.0%: CPT | Performed by: FAMILY MEDICINE

## 2024-09-18 PROCEDURE — 95251 CONT GLUC MNTR ANALYSIS I&R: CPT | Performed by: FAMILY MEDICINE

## 2024-09-18 PROCEDURE — 83036 HEMOGLOBIN GLYCOSYLATED A1C: CPT | Performed by: FAMILY MEDICINE

## 2024-09-18 PROCEDURE — 99214 OFFICE O/P EST MOD 30 MIN: CPT | Performed by: FAMILY MEDICINE

## 2024-09-18 RX ORDER — INSULIN PMP CART,AUT,G6/7,CNTR
EACH SUBCUTANEOUS
Qty: 30 EACH | Refills: 3 | Status: SHIPPED | OUTPATIENT
Start: 2024-09-18

## 2024-09-18 RX ORDER — PROCHLORPERAZINE 25 MG/1
SUPPOSITORY RECTAL
Qty: 1 EACH | Refills: 3 | Status: SHIPPED | OUTPATIENT
Start: 2024-09-18

## 2024-09-18 RX ORDER — PROCHLORPERAZINE 25 MG/1
SUPPOSITORY RECTAL
Qty: 9 EACH | Refills: 3 | Status: SHIPPED | OUTPATIENT
Start: 2024-09-18

## 2024-09-18 RX ORDER — INSULIN LISPRO 100 [IU]/ML
INJECTION, SOLUTION INTRAVENOUS; SUBCUTANEOUS
Qty: 90 ML | Refills: 3 | Status: SHIPPED | OUTPATIENT
Start: 2024-09-18

## 2024-09-18 RX ORDER — ATORVASTATIN CALCIUM 10 MG/1
10 TABLET, FILM COATED ORAL DAILY
Qty: 90 TABLET | Refills: 3 | Status: SHIPPED | OUTPATIENT
Start: 2024-09-18

## 2024-11-05 ENCOUNTER — TELEPHONE (OUTPATIENT)
Dept: ENDOCRINOLOGY | Age: 49
End: 2024-11-05

## 2024-11-05 NOTE — TELEPHONE ENCOUNTER
Patient calling in checking status of Omni pod . Patient was informed him at last visit it should be in approximately in October. Please advise [phone].

## 2024-11-13 NOTE — TELEPHONE ENCOUNTER
Spoke with patient and he is using the jg on his phone. I sent his settings from LOV for him to review

## 2024-11-15 DIAGNOSIS — E13.9 LADA (LATENT AUTOIMMUNE DIABETES IN ADULTS), MANAGED AS TYPE 1 (HCC): ICD-10-CM

## 2024-11-15 RX ORDER — PROCHLORPERAZINE 25 MG/1
SUPPOSITORY RECTAL
Qty: 9 EACH | Refills: 3 | Status: SHIPPED | OUTPATIENT
Start: 2024-11-15

## 2024-12-03 NOTE — TELEPHONE ENCOUNTER
----- Message from ANNABEL Espinal NP sent at 2/17/2022  4:09 PM EST -----  Please call patient and inform him that I have reviewed his labwork and that his ANGELIQUE antibody is + for Type 1 DM. His Vit D and lipid panel and all are within range, Decrease lantus to 16. No

## 2025-01-06 DIAGNOSIS — E13.9 LADA (LATENT AUTOIMMUNE DIABETES IN ADULTS), MANAGED AS TYPE 1 (HCC): ICD-10-CM

## 2025-01-06 RX ORDER — INSULIN PMP CART,AUT,G6/7,CNTR
EACH SUBCUTANEOUS
Qty: 30 EACH | Refills: 3 | Status: SHIPPED | OUTPATIENT
Start: 2025-01-06

## 2025-01-27 ENCOUNTER — OFFICE VISIT (OUTPATIENT)
Dept: ENDOCRINOLOGY | Age: 50
End: 2025-01-27
Payer: COMMERCIAL

## 2025-01-27 VITALS
RESPIRATION RATE: 16 BRPM | HEIGHT: 72 IN | BODY MASS INDEX: 26.01 KG/M2 | DIASTOLIC BLOOD PRESSURE: 70 MMHG | SYSTOLIC BLOOD PRESSURE: 127 MMHG | HEART RATE: 56 BPM | WEIGHT: 192 LBS | OXYGEN SATURATION: 100 %

## 2025-01-27 DIAGNOSIS — E78.5 HYPERLIPIDEMIA, UNSPECIFIED HYPERLIPIDEMIA TYPE: ICD-10-CM

## 2025-01-27 DIAGNOSIS — E13.9 LADA (LATENT AUTOIMMUNE DIABETES IN ADULTS), MANAGED AS TYPE 1 (HCC): Primary | ICD-10-CM

## 2025-01-27 DIAGNOSIS — Z91.119 DIETARY NONCOMPLIANCE: ICD-10-CM

## 2025-01-27 DIAGNOSIS — E55.9 VITAMIN D DEFICIENCY: ICD-10-CM

## 2025-01-27 LAB
ESTIMATED AVERAGE GLUCOSE: NORMAL
HBA1C MFR BLD: 8.2 %

## 2025-01-27 PROCEDURE — 95251 CONT GLUC MNTR ANALYSIS I&R: CPT | Performed by: FAMILY MEDICINE

## 2025-01-27 PROCEDURE — 3052F HG A1C>EQUAL 8.0%<EQUAL 9.0%: CPT | Performed by: FAMILY MEDICINE

## 2025-01-27 PROCEDURE — 99214 OFFICE O/P EST MOD 30 MIN: CPT | Performed by: FAMILY MEDICINE

## 2025-01-27 NOTE — PROGRESS NOTES
MHYX Terarecon  [facility] Department of Endocrinology Diabetes and Metabolism   [address] [place] [address]    Phone: [phone]  Fax: [phone]    Date of Service: 1/27/2025    Primary Care Physician: Elan Dupont MD  Referring physician: No ref. provider found  Provider: ANNABEL Siu - CNP     Reason for the visit:  DM Type 1 - IRMA    History of Present Illness:  The history is provided by the patient. No  was used. Accuracy of the patient data is excellent.     Clayton Waddell is a very pleasant 49 y.o. male seen today for diabetes management.     Clayton Waddell was diagnosed with diabetes at age 46 during 2021 hospitalization for DKA (Recent ANGELIQUE antibody +  > 250; pt IRMA onset).     He is currently on OmniPod 5/Dexcom G6    Pump setting: basal 0.8, CR 12 AM 14, 5 PM 12, ISF 58, active insulin time 2.5hours, target 110, correct above 110      The patient has been checking blood sugar via Dexcom G6  TIR 69%  Hyperglycemia 30%  Lows 1%  AVG BS  170  GMI 7.4%        TDD 32.7 units    Patient's hemoglobin A1c is uncontrolled, however his pump download in the last 2 weeks reveals improved control with GMI of 7.4%    Most recent A1c results summarized below  Lab Results   Component Value Date/Time    LABA1C 8.2 01/27/2025 12:00 AM    LABA1C 9.1 09/18/2024 03:30 PM    LABA1C 8.2 05/28/2024 04:00 PM     Patient reported infrequent hypoglycemic episodes  Patient works at [employer] and sometimes does not get time to eat throughout day. He comes home and has large dinner with multiple snacks at night.  The patient has not  been mindful of what has been eating and following diabetic diet as encouraged  He skips breakfast   I reviewed current medications and the patient has no issues with diabetes medications  Clayton Waddell up to date with eye exam  The patient performs own feet care.  Microvascular complications:  No Retinopathy,

## 2025-03-19 RX ORDER — LISINOPRIL 5 MG/1
5 TABLET ORAL DAILY
Qty: 90 TABLET | Refills: 3 | Status: SHIPPED | OUTPATIENT
Start: 2025-03-19

## 2025-05-29 ENCOUNTER — HOSPITAL ENCOUNTER (OUTPATIENT)
Age: 50
Discharge: HOME | End: 2025-05-29
Payer: COMMERCIAL

## 2025-05-29 ENCOUNTER — RESULTS FOLLOW-UP (OUTPATIENT)
Dept: ENDOCRINOLOGY | Age: 50
End: 2025-05-29

## 2025-05-29 DIAGNOSIS — E55.9 VITAMIN D DEFICIENCY: ICD-10-CM

## 2025-05-29 DIAGNOSIS — E78.5 HYPERLIPIDEMIA, UNSPECIFIED HYPERLIPIDEMIA TYPE: ICD-10-CM

## 2025-05-29 DIAGNOSIS — E13.9 LADA (LATENT AUTOIMMUNE DIABETES IN ADULTS), MANAGED AS TYPE 1 (HCC): ICD-10-CM

## 2025-05-29 LAB
25(OH)D3 SERPL-MCNC: 29.3 NG/ML (ref 30–100)
ALBUMIN SERPL-MCNC: 4.2 G/DL (ref 3.5–5.2)
ALP SERPL-CCNC: 66 U/L (ref 40–129)
ALT SERPL-CCNC: 15 U/L (ref 0–40)
ANION GAP SERPL CALCULATED.3IONS-SCNC: 10 MMOL/L (ref 7–16)
AST SERPL-CCNC: 19 U/L (ref 0–39)
BASOPHILS # BLD: 0.05 K/UL (ref 0–0.2)
BASOPHILS NFR BLD: 1 % (ref 0–2)
BILIRUB SERPL-MCNC: 0.2 MG/DL (ref 0–1.2)
BUN SERPL-MCNC: 13 MG/DL (ref 6–20)
CALCIUM SERPL-MCNC: 9.3 MG/DL (ref 8.6–10.2)
CHLORIDE SERPL-SCNC: 106 MMOL/L (ref 98–107)
CHOLEST SERPL-MCNC: 144 MG/DL
CO2 SERPL-SCNC: 24 MMOL/L (ref 22–29)
CREAT SERPL-MCNC: 1 MG/DL (ref 0.7–1.2)
CREAT UR-MCNC: 174 MG/DL (ref 40–278)
EOSINOPHIL # BLD: 0.16 K/UL (ref 0.05–0.5)
EOSINOPHILS RELATIVE PERCENT: 3 % (ref 0–6)
ERYTHROCYTE [DISTWIDTH] IN BLOOD BY AUTOMATED COUNT: 13.7 % (ref 11.5–15)
GFR, ESTIMATED: >90 ML/MIN/1.73M2
GLUCOSE SERPL-MCNC: 144 MG/DL (ref 74–99)
HBA1C MFR BLD: 7.9 % (ref 4–5.6)
HCT VFR BLD AUTO: 42.3 % (ref 37–54)
HDLC SERPL-MCNC: 57 MG/DL
HGB BLD-MCNC: 13.3 G/DL (ref 12.5–16.5)
IMM GRANULOCYTES # BLD AUTO: <0.03 K/UL (ref 0–0.58)
IMM GRANULOCYTES NFR BLD: 0 % (ref 0–5)
LDLC SERPL CALC-MCNC: 76 MG/DL
LYMPHOCYTES NFR BLD: 1.79 K/UL (ref 1.5–4)
LYMPHOCYTES RELATIVE PERCENT: 33 % (ref 20–42)
MCH RBC QN AUTO: 27.7 PG (ref 26–35)
MCHC RBC AUTO-ENTMCNC: 31.4 G/DL (ref 32–34.5)
MCV RBC AUTO: 87.9 FL (ref 80–99.9)
MICROALBUMIN UR-MCNC: <12 MG/L (ref 0–20)
MICROALBUMIN/CREAT UR-RTO: <7 MCG/MG CREAT (ref 0–30)
MONOCYTES NFR BLD: 0.44 K/UL (ref 0.1–0.95)
MONOCYTES NFR BLD: 8 % (ref 2–12)
NEUTROPHILS NFR BLD: 55 % (ref 43–80)
NEUTS SEG NFR BLD: 2.95 K/UL (ref 1.8–7.3)
PLATELET # BLD AUTO: 286 K/UL (ref 130–450)
PMV BLD AUTO: 9.7 FL (ref 7–12)
POTASSIUM SERPL-SCNC: 4.1 MMOL/L (ref 3.5–5)
PROT SERPL-MCNC: 7.1 G/DL (ref 6.4–8.3)
RBC # BLD AUTO: 4.81 M/UL (ref 3.8–5.8)
SODIUM SERPL-SCNC: 140 MMOL/L (ref 132–146)
T4 FREE SERPL-MCNC: 1.2 NG/DL (ref 0.9–1.7)
TRIGL SERPL-MCNC: 55 MG/DL
TSH SERPL DL<=0.05 MIU/L-ACNC: 3.12 UIU/ML (ref 0.27–4.2)
VLDLC SERPL CALC-MCNC: 11 MG/DL
WBC OTHER # BLD: 5.4 K/UL (ref 4.5–11.5)

## 2025-05-29 PROCEDURE — 84439 ASSAY OF FREE THYROXINE: CPT

## 2025-05-29 PROCEDURE — 83036 HEMOGLOBIN GLYCOSYLATED A1C: CPT

## 2025-05-29 PROCEDURE — 36415 COLL VENOUS BLD VENIPUNCTURE: CPT

## 2025-05-29 PROCEDURE — 85025 COMPLETE CBC W/AUTO DIFF WBC: CPT

## 2025-05-29 PROCEDURE — 84443 ASSAY THYROID STIM HORMONE: CPT

## 2025-05-29 PROCEDURE — 82570 ASSAY OF URINE CREATININE: CPT

## 2025-05-29 PROCEDURE — 80053 COMPREHEN METABOLIC PANEL: CPT

## 2025-05-29 PROCEDURE — 82043 UR ALBUMIN QUANTITATIVE: CPT

## 2025-05-29 PROCEDURE — 82306 VITAMIN D 25 HYDROXY: CPT

## 2025-05-29 PROCEDURE — 80061 LIPID PANEL: CPT

## 2025-06-03 ENCOUNTER — OFFICE VISIT (OUTPATIENT)
Dept: ENDOCRINOLOGY | Age: 50
End: 2025-06-03
Payer: COMMERCIAL

## 2025-06-03 VITALS
SYSTOLIC BLOOD PRESSURE: 120 MMHG | BODY MASS INDEX: 25.85 KG/M2 | HEART RATE: 62 BPM | OXYGEN SATURATION: 99 % | DIASTOLIC BLOOD PRESSURE: 84 MMHG | TEMPERATURE: 98.8 F | WEIGHT: 190.6 LBS

## 2025-06-03 DIAGNOSIS — Z79.4 TYPE 2 DIABETES MELLITUS WITHOUT COMPLICATION, WITH LONG-TERM CURRENT USE OF INSULIN (HCC): ICD-10-CM

## 2025-06-03 DIAGNOSIS — E11.9 TYPE 2 DIABETES MELLITUS WITHOUT COMPLICATION, WITH LONG-TERM CURRENT USE OF INSULIN (HCC): ICD-10-CM

## 2025-06-03 DIAGNOSIS — E13.9 LADA (LATENT AUTOIMMUNE DIABETES IN ADULTS), MANAGED AS TYPE 1 (HCC): Primary | ICD-10-CM

## 2025-06-03 PROCEDURE — 95251 CONT GLUC MNTR ANALYSIS I&R: CPT | Performed by: FAMILY MEDICINE

## 2025-06-03 PROCEDURE — 99214 OFFICE O/P EST MOD 30 MIN: CPT | Performed by: FAMILY MEDICINE

## 2025-06-03 PROCEDURE — 3051F HG A1C>EQUAL 7.0%<8.0%: CPT | Performed by: FAMILY MEDICINE

## 2025-06-03 RX ORDER — INSULIN LISPRO 100 [IU]/ML
INJECTION, SOLUTION INTRAVENOUS; SUBCUTANEOUS
Qty: 90 ML | Refills: 3 | Status: SHIPPED | OUTPATIENT
Start: 2025-06-03

## 2025-06-03 NOTE — PROGRESS NOTES
and manage the above addressed medical conditions.     Return in about 3 months (around 9/3/2025) for Type 1 DM.    The above issues were reviewed with the patient who understood and agreed with the plan.    Thank you for allowing us to participate in the care of this patient. Please do not hesitate to contact us with any additional questions.     ANNABEL Siu CNP    [facility]   [address] [place] [address].  [address]  [place] [address]  Phone: [phone]  Fax: [phone]   --------------------------------------------  An electronic signature was used to authenticate this note.ANNABEL Siu CNP on 6/3/2025 at 4:27 PM

## 2025-08-31 DIAGNOSIS — E13.9 LADA (LATENT AUTOIMMUNE DIABETES IN ADULTS), MANAGED AS TYPE 1 (HCC): ICD-10-CM

## 2025-09-03 RX ORDER — PROCHLORPERAZINE 25 MG/1
SUPPOSITORY RECTAL
Qty: 1 EACH | Refills: 3 | Status: SHIPPED | OUTPATIENT
Start: 2025-09-03

## (undated) DEVICE — STANDARD HYPODERMIC NEEDLE,POLYPROPYLENE HUB: Brand: MONOJECT

## (undated) DEVICE — CONTROL SYRINGE LUER-LOCK TIP: Brand: MONOJECT

## (undated) DEVICE — READY WET SKIN SCRUB TRAY-LF: Brand: MEDLINE INDUSTRIES, INC.

## (undated) DEVICE — GAUZE,SPONGE,4"X4",8PLY,STRL,LF,10/TRAY: Brand: MEDLINE

## (undated) DEVICE — PACK PROCEDURE SURG GEN CUST

## (undated) DEVICE — SKIN AFFIX SURG ADHESIVE 72/CS 0.55ML: Brand: MEDLINE

## (undated) DEVICE — SOLUTION IV IRRIG POUR BRL 0.9% SODIUM CHL 2F7124

## (undated) DEVICE — SUTURE FIBERWIRE SZ 5 L38IN NONABSORBABLE BLU L48MM 1/2 AR7211

## (undated) DEVICE — DRILL MINI CORDLESS

## (undated) DEVICE — CHLORAPREP 26ML ORANGE

## (undated) DEVICE — BANDAGE,ELASTIC,ESMARK,STERILE,4"X9',LF: Brand: MEDLINE

## (undated) DEVICE — Z DISCONTINUED USE 2716304 SUTURE STRATAFIX SPRL SZ 3-0 L12IN ABSRB UD FS-1 L24MM 3/8

## (undated) DEVICE — BIT DRL L5IN DIA2MM STD ST S STL TWST BUSA

## (undated) DEVICE — STRIP,CLOSURE,WOUND,MEDI-STRIP,1/2X4: Brand: MEDLINE

## (undated) DEVICE — SUTURE FIBERWIRE SZ 2 W/ TAPERED NEEDLE BLUE L38IN NONABSORB BLU L26.5MM 1/2 CIRCLE AR7200

## (undated) DEVICE — COVER HNDL LT DISP

## (undated) DEVICE — INTENDED FOR TISSUE SEPARATION, AND OTHER PROCEDURES THAT REQUIRE A SHARP SURGICAL BLADE TO PUNCTURE OR CUT.: Brand: BARD-PARKER ® STAINLESS STEEL BLADES

## (undated) DEVICE — DOUBLE BASIN SET: Brand: MEDLINE INDUSTRIES, INC.

## (undated) DEVICE — Z DISCONTINUED PER MEDLINE USE 2741942 DRESSING AQUACEL 6 IN ALG W9XL15CM SIL CVR WTRPRF VIR BACT BARR ANTIMIC

## (undated) DEVICE — DRAPE,TOP,102X53,STERILE: Brand: MEDLINE

## (undated) DEVICE — DRAPE,REIN 53X77,STERILE: Brand: MEDLINE

## (undated) DEVICE — DRAPE 24X23IN RADIOLOGY CASS ADHES STRIP

## (undated) DEVICE — PADDING CAST W6INXL4YD COT LO LINTING WYTEX

## (undated) DEVICE — TUBING, SUCTION, 9/32" X 10', STRAIGHT: Brand: MEDLINE

## (undated) DEVICE — ZIMMER® STERILE DISPOSABLE TOURNIQUET CUFF WITH PLC, DUAL PORT, SINGLE BLADDER, 34 IN. (86 CM)

## (undated) DEVICE — TOTAL KNEE PK

## (undated) DEVICE — PATIENT RETURN ELECTRODE, SINGLE-USE, CONTACT QUALITY MONITORING, ADULT, WITH 9FT CORD, FOR PATIENTS WEIGING OVER 33LBS. (15KG): Brand: MEGADYNE

## (undated) DEVICE — SPONGE LAP W18XL18IN WHT COT 4 PLY FLD STRUNG RADPQ DISP ST

## (undated) DEVICE — SET ORTHO STD STORTSTD2